# Patient Record
Sex: FEMALE | Race: WHITE | NOT HISPANIC OR LATINO | Employment: FULL TIME | URBAN - NONMETROPOLITAN AREA
[De-identification: names, ages, dates, MRNs, and addresses within clinical notes are randomized per-mention and may not be internally consistent; named-entity substitution may affect disease eponyms.]

---
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
## 2023-01-12 NOTE — H&P (VIEW-ONLY)
Nursing Notes:   Radha Lipscomb LPN  6/1/2018  2:04 PM  Signed  Patient presents to the clinic today for a follow up on meds.    Radha Lipscomb LPN.................. 6/1/2018 2:03 PM       SUBJECTIVE:   CC:  Katrina Hillman is a 13 year old female who presents to clinic today for the following health issues:  Medication follow up     HPI  Katrina Hillman is a 13 year old female in with mom for medication follow up.    She is on Lexapro for depression and anxiety symptoms.  She's been out of her lexapro for several days.  Has more negative thinking, intrusive thoughts.  Sleep has been better - attributes this to a new boyfriend and having someone to talk to before going to bed.    For the past 8 months, after eating she will have episodes of throwing up.  At other times she will throw up acid/bile.  Part feels like heartburn.   Feels better after throwing up.  If she tries to swallow it back down, it comes back up harder and faster.  No blood with throwing up. No BM changes.  Denies alcohol use.  Marijuana use is once a week.     LMP 3 weeks ago. On oral contraceptive pills.  Due for STD testing.       No Known Allergies  Current Outpatient Prescriptions   Medication     escitalopram (LEXAPRO) 10 MG tablet     norgestrel-ethinyl estradiol (LOW-OGESTREL) 0.3-30 MG-MCG per tablet     omeprazole (PRILOSEC) 20 MG CR capsule     [DISCONTINUED] escitalopram (LEXAPRO) 10 MG tablet     [DISCONTINUED] norgestrel-ethinyl estradiol (LOW-OGESTREL) 0.3-30 MG-MCG per tablet     No current facility-administered medications for this visit.       Patient Active Problem List    Diagnosis Date Noted     Gluteal tendinitis of left buttock 03/14/2018     Priority: Medium     Constipation 02/23/2018     Priority: Medium     Intentional drug overdose (H) 10/11/2017     Priority: Medium     High risk sexual behavior 04/14/2017     Priority: Medium     Deliberate self-cutting 02/27/2017     Priority: Medium     Depression in pediatric  "patient 01/15/2016     Priority: Medium     Tic disorder 07/11/2012     Priority: Medium     Past Surgical History:   Procedure Laterality Date     OTHER SURGICAL HISTORY       03/17/05,,HERNIA REPAIR,Repair of an epigastric and umbilical     Family History   Problem Relation Age of Onset     CANCER Mother      Cancer,Hodgkin's lymphoma as teenager.     Other - See Comments Father      GI Disease,Ulcer, hernia     Other - See Comments Paternal Uncle      tics in childhood       Review of Systems   Constitutional: Positive for fatigue.   Gastrointestinal: Positive for heartburn and vomiting. Negative for abdominal distention, constipation and diarrhea.   Genitourinary: Negative.    Neurological: Negative.    Psychiatric/Behavioral: Negative for behavioral problems.        No behavior problems according to patient        PHQ-2 Score:         PHQ-9 SCORE 4/14/2017 9/5/2017 6/1/2018   Total Score 15 4 7         OBJECTIVE:     /60 (BP Location: Right arm, Patient Position: Sitting, Cuff Size: Adult Regular)  Pulse 64  Ht 5' 9\" (1.753 m)  Wt 184 lb (83.5 kg)  LMP 05/11/2018 (Approximate)  Breastfeeding? No  BMI 27.17 kg/m2  Body mass index is 27.17 kg/(m^2).  Physical Exam   Constitutional: She appears well-developed and well-nourished.   Cardiovascular: Normal rate and regular rhythm.    Pulmonary/Chest: Breath sounds normal.   Abdominal: Bowel sounds are normal. She exhibits no distension. There is no tenderness. There is no rebound and no guarding.   Skin:   Healing lacerations on forearms.   Psychiatric:   More conversant today   Nursing note and vitals reviewed.       Results for orders placed or performed in visit on 06/01/18   Comprehensive metabolic panel   Result Value Ref Range    Sodium 140 134 - 144 mmol/L    Potassium 3.8 3.5 - 5.1 mmol/L    Chloride 108 (H) 98 - 107 mmol/L    Carbon Dioxide 27 21 - 31 mmol/L    Anion Gap 5 3 - 14 mmol/L    Glucose 76 70 - 105 mg/dL    Urea Nitrogen 8 7 - 25 " mg/dL    Creatinine 0.78 0.60 - 1.20 mg/dL    GFR Estimate GFR not calculated, patient <16 years old. mL/min/1.7m2    GFR Estimate If Black GFR not calculated, patient <16 years old. mL/min/1.7m2    Calcium 9.5 8.6 - 10.3 mg/dL    Bilirubin Total 0.4 0.3 - 1.0 mg/dL    Albumin 4.0 3.5 - 5.7 g/dL    Protein Total 6.9 6.4 - 8.9 g/dL    Alkaline Phosphatase 54 34 - 104 U/L    ALT 13 7 - 52 U/L    AST 16 13 - 39 U/L   CBC and Differential   Result Value Ref Range    WBC 10.6 4.0 - 11.0 10e9/L    RBC Count 4.83 3.7 - 5.3 10e12/L    Hemoglobin 13.6 11.7 - 15.7 g/dL    Hematocrit 42.1 35.0 - 47.0 %    MCV 87 77 - 100 fl    MCH 28.2 26.5 - 33.0 pg    MCHC 32.3 31.5 - 36.5 g/dL    RDW 13.0 10.0 - 15.0 %    Platelet Count 220 150 - 450 10e9/L    Diff Method Automated Method     % Neutrophils 60.3 %    % Lymphocytes 29.5 %    % Monocytes 7.0 %    % Eosinophils 2.8 %    % Basophils 0.2 %    % Immature Granulocytes 0.2 %    Absolute Neutrophil 6.4 1.3 - 7.0 10e9/L    Absolute Lymphocytes 3.1 1.0 - 5.8 10e9/L    Absolute Monocytes 0.7 0.0 - 1.3 10e9/L    Absolute Eosinophils 0.3 0.0 - 0.7 10e9/L    Absolute Basophils 0.0 0.0 - 0.2 10e9/L    Abs Immature Granulocytes 0.0 0 - 0.4 10e9/L   Lipase   Result Value Ref Range    Lipase 9 (L) 11 - 82 U/L   Drug of Abuse Screen Urine [facility]   Result Value Ref Range    Amphetamine Qual Urine Not Detected NDET^Not Detected    Benzodiazepine Qual Urine Not Detected NDET^Not Detected    Cocaine Qual Urine Not Detected NDET^Not Detected    Methadone Qual Urine Not Detected NDET^Not Detected    PCP Qual Urine Not Detected NDET^Not Detected    Opiates Qualitative Urine Not Detected NDET^Not Detected    Oxycodone Qualitative Urine Not Detected NDET^Not Detected ng/mL    Propoxyphene Qualitative Urine Not Detected NDET^Not Detected ng/mL    Tricyclic Antidepressants Qual Urine Not Detected NDET^Not Detected ng/mL    Methamphetamine Qualitative Urine Not Detected NDET^Not Detected ng/mL    Barbiturates  Qual Urine Not Detected NDET^Not Detected    Cannabinoids Qualitative Urine Presumptive positive-Unconfirmed result (A) NDET^Not Detected ng/mL    Buprenorphine Qualitative Urine Not Detected NDET^Not Detected ng/mL   GC/Chlamydia by PCR - [facility]   Result Value Ref Range    Specimen Source Urine     Neisseria gonorrhoreae PCR Not Detected NDET^Not Detected    Chlamydia Trachomatis PCR Not Detected NDET^Not Detected         ASSESSMENT/PLAN:       ICD-10-CM    1. Depression in pediatric patient F32.9 escitalopram (LEXAPRO) 10 MG tablet     Drug of Abuse Screen Urine [facility]   2. High risk sexual behavior Z72.51 GC/Chlamydia by PCR - [facility]     Drug of Abuse Screen Urine [facility]   3. Persistent recurrent vomiting R11.10 omeprazole (PRILOSEC) 20 MG CR capsule     Comprehensive metabolic panel     CBC and Differential     Lipase     GASTROENTEROLOGY ADULT REF PROCEDURE ONLY     Drug of Abuse Screen Urine [facility]     CANCELED: Drug screen comprehensive blood ([facility])   4. Oral contraception initial prescription Z30.011 norgestrel-ethinyl estradiol (LOW-OGESTREL) 0.3-30 MG-MCG per tablet     Drug of Abuse Screen Urine [facility]   5. Cannabis abuse F12.10 omeprazole (PRILOSEC) 20 MG CR capsule     Drug of Abuse Screen Urine [facility]            PLAN:  1.  Her last PHQ 9 score from the spring was 15, now improved.  We will have her continue on Lexapro 10 mg daily.  She is resistant to participating in counseling visits.  There continues to be complaint between parents, conflict between patient and friend group.  Discussed that these were issues separate from what occasion would help.  2.  STD testing obtained today, negative.  Continue oral contraceptives.  Recommend use of condoms.  3.  She started on omeprazole 20 mg a day.  Discussed that I feel that her most likely cause of vomiting episodes was related to marijuana use.  Patient is reluctant in agreement of this of as her diagnosis.  I do not think it is related to her birth control pills given timing  of onset.  Currently, SSRIs can cause nausea as well.  Recommend EGD due to FH of GI disorders/hiatal hernia.  4. Recommend no marijuana use.  Discussed with mom that I did not think my should get her driving permit until she quits smoking marijuana.      Follow up in 3 months.      LOUIS SINCLAIR MD  [facility]     lower

## 2023-02-06 ENCOUNTER — OFFICE VISIT (OUTPATIENT)
Dept: FAMILY MEDICINE | Facility: OTHER | Age: 18
End: 2023-02-06
Attending: PHYSICIAN ASSISTANT
Payer: COMMERCIAL

## 2023-02-06 VITALS
WEIGHT: 192.6 LBS | RESPIRATION RATE: 12 BRPM | DIASTOLIC BLOOD PRESSURE: 82 MMHG | SYSTOLIC BLOOD PRESSURE: 128 MMHG | HEART RATE: 69 BPM | OXYGEN SATURATION: 99 % | HEIGHT: 69 IN | BODY MASS INDEX: 28.53 KG/M2 | TEMPERATURE: 98 F

## 2023-02-06 DIAGNOSIS — G56.03 BILATERAL CARPAL TUNNEL SYNDROME: ICD-10-CM

## 2023-02-06 PROBLEM — M76.02 GLUTEAL TENDINITIS OF LEFT BUTTOCK: Status: RESOLVED | Noted: 2018-03-14 | Resolved: 2023-02-06

## 2023-02-06 PROCEDURE — 99214 OFFICE O/P EST MOD 30 MIN: CPT | Performed by: PHYSICIAN ASSISTANT

## 2023-02-06 ASSESSMENT — PAIN SCALES - GENERAL: PAINLEVEL: NO PAIN (0)

## 2023-02-06 NOTE — PROGRESS NOTES
Assessment & Plan     1. BMI 28.0-28.9,adult  Chronic, overweight, eating difficulties/disorders. Longstanding history of alternating binge eating, anorexia, trialing numerous different diets secondary to mental health.  Has been following closely with mental health through therapy and establishing with medication manager.  Patient interested in meeting with nutritionist to help relearn how to fuel her body appropriately in addition to continuing work with mental health.  Referral placed.  - Nutrition Referral; Future    2. Bilateral carpal tunnel syndrome  Chronic, stable.  Diagnosed via EMG on 11/17/2022.  Has tried and failed conservative management with bracing.  Requesting referral to orthopedics for consideration of surgical management.  - Orthopedic  Referral; Future        Return if symptoms worsen or fail to improve.    Joelle Faith PA-C  [facility]   Katrina is a 17 year old{, presenting for the following health issues:  No chief complaint on file.      History of Present Illness       Reason for visit:  Eating concerns    She eats 2-3 servings of fruits and vegetables daily.She consumes 2 sweetened beverage(s) daily.She exercises with enough effort to increase her heart rate 60 or more minutes per day.  She exercises with enough effort to increase her heart rate 5 days per week. She is missing 1 dose(s) of medications per week.  She is not taking prescribed medications regularly due to remembering to take.     Here for discussion regarding eating concerns.  Patient reports she has struggled with eating habit difficulties since she was approximately 12 years of age.  She has alternated between binge eating, starving herself, trying multiple different diets.  She feels her eating difficulties are in large part due to her mental health, body image, prior trauma, depression.  She does follow with a therapist, is establishing with a mental health medication  manager.  She would like referral to a nutritionist to help relearn how to fuel her body appropriately.    Diagnosed with bilateral carpal tunnel syndrome via EMG and November 2022.  She has worn the wrist brace consistently for 8+ weeks and has noticed minimal improvement.  She is consistently using her wrists with repetitive range of motion and lifting at her job.  She is interested in discussing surgical options with orthopedics at this time.        PAST MEDICAL HISTORY:   Past Medical History:   Diagnosis Date     Abdominal pain     04/24/08,x6 months thought to be secondary to GERD (upper GI with small bowel followthrough scheduled)     Anxiety      Carotid bruit      Constipation     No Comments Provided     Dyspnea      Gastroesophageal reflux disease with esophagitis      High risk heterosexual behavior 04/14/2017    G1 2/22     History of Pamella-Parkinson-White (WPW) syndrome     seen on 2019 [place]     Major depressive disorder, single episode     1/15/2016     Nicotine use disorder 07/20/2018     Palpitations      Pneumonia     2003,1 day hospitalization     Tetrahydrocannabinol (THC) use disorder, mild, abuse 07/20/2018     Tic disorder     07/2012,both motor and verbal       PAST SURGICAL HISTORY:   Past Surgical History:   Procedure Laterality Date     DILATION AND CURETTAGE SUCTION  02/08/2022    [facility]     DILATION AND CURETTAGE SUCTION N/A 2/9/2022    Procedure: DILATION AND CURETTAGE, UTERUS, USING SUCTION;  Surgeon: Jen Cisneros MD;  Location: [facility] OR     ESOPHAGOSCOPY, GASTROSCOPY, DUODENOSCOPY (EGD), COMBINED N/A 06/08/2018    Procedure: COMBINED ESOPHAGOSCOPY, GASTROSCOPY, DUODENOSCOPY (EGD), BIOPSY SINGLE OR MULTIPLE;  Gastroscopy;  Surgeon: Sreekanth Shaw MD;  Location: [facility] OR     HERNIA REPAIR       03/17/05,,HERNIA REPAIR,Repair of an epigastric and umbilical       FAMILY HISTORY:   Family History   Problem Relation Age of Onset     Cancer Mother         Cancer,Hodgkin's lymphoma as  "teenager.     Other - See Comments Father         GI Disease,Ulcer, hernia     Bleeding Disorder Sister         Bloody noses     Heart Defect Maternal Grandfather      Other - See Comments Paternal Uncle         tics in childhood     Anesthesia Reaction No family hx of        SOCIAL HISTORY:   Social History     Tobacco Use     Smoking status: Former     Packs/day: 0.50     Years: 5.00     Pack years: 2.50     Types: Cigarettes     Quit date: 2022     Years since quittin.1     Smokeless tobacco: Never   Substance Use Topics     Alcohol use: Not Currently     Comment: sometimes      No Known Allergies  Current Outpatient Medications   Medication     Cholecalciferol (VITAMIN D3) 50 MCG ( UT) TABS     clindamycin-benzoyl peroxide (BENZACLIN) 1-5 % external gel     Multiple Vitamins-Minerals (ONE-A-DAY WOMENS PO)     nicotine (NICODERM CQ) 14 MG/24HR 24 hr patch     nicotine (NICODERM CQ) 7 MG/24HR 24 hr patch     nicotine (NICORETTE) 4 MG lozenge     paragard intrauterine copper device     tretinoin (RETIN-A) 0.05 % external cream     nicotine (NICODERM CQ) 21 MG/24HR 24 hr patch     No current facility-administered medications for this visit.         Review of Systems   Per HPI        Objective    /82   Pulse 69   Temp 98  F (36.7  C)   Resp 12   Ht 1.753 m (5' 9\")   Wt 87.4 kg (192 lb 9.6 oz)   SpO2 99%   BMI 28.44 kg/m    Body mass index is 28.44 kg/m .  Physical Exam   General: Pleasant, in no apparent distress.  Cardiovascular: Regular rate and rhythm with S1 equal to S2. No murmurs, friction rubs, or gallops.   Respiratory: Lungs are resonant and clear to auscultation bilaterally. No wheezes, crackles, or rhonchi.  Psych: Appropriate mood and affect.      "

## 2023-02-06 NOTE — NURSING NOTE
Patient presents to clinic requesting a referral for a dietician due to mental health. Patient states that she is not eating well due to mental health.  Alina Steinberg LPN ....................  2/6/2023   3:28 PM

## 2023-02-13 ENCOUNTER — OFFICE VISIT (OUTPATIENT)
Dept: FAMILY MEDICINE | Facility: OTHER | Age: 18
End: 2023-02-13
Attending: PHYSICIAN ASSISTANT
Payer: COMMERCIAL

## 2023-02-13 PROCEDURE — 97802 MEDICAL NUTRITION INDIV IN: CPT | Mod: PN | Performed by: DIETITIAN, REGISTERED

## 2023-02-14 VITALS — BODY MASS INDEX: 28.44 KG/M2 | HEIGHT: 69 IN | WEIGHT: 192 LBS

## 2023-02-14 NOTE — PROGRESS NOTES
Katrina is here for MNT related to disordered eating, binge eating behavior.  PCP Dr. Rei Marquez.    Katrina reports she has had an eating disorder since she was 12. She started with extreme restriction and this moved to binge eating behavior. Last year she extremely limited her intake, resulting in a 30# weight loss in a short time. She has been in therapy and is doing well with her anxiety.  She works full time at a physical job. She lives with her grandparents. Food security is not an issue.    She reports no alcohol use but uses marijuana which affects her binge eating.     She takes 3,000 international unit(s) vitamin D. She notices that the spring and summer are easier for her and she has more energy.     Body mass index is 28.35 kg/m .     She currently eats 3 meals and will binge at night. Has not vomited after a binge for several months.     Estimated nutritional needs:  2,100 calories, 85 grams protein    Reviewed goal intakes using food groups and not calories or portion size. Discussed balance of healthy fats, proteins and carbs to improve mood and reduce triggers.     Gave EDQL tool for use with her therapist. Gave resources of Brain Based Health and Emotional Brain Training for therapy.    Handouts: Meal Plan with food groups and sample recipes. Strategies for assessing hunger levels and cues.     Dx: Binge Eating Behavior  Plan: Therapy weekly.  Nutrition-weekly balanced nutrition, proper intake    She will f/u in 6 weeks with RDN for further meal planning and proper nutrition goal setting     Time spent: 60 min  KRISTIN K. KLINEFELTER, RD on 2/14/2023 at 10:22 AM

## 2023-03-17 ENCOUNTER — OFFICE VISIT (OUTPATIENT)
Dept: FAMILY MEDICINE | Facility: OTHER | Age: 18
End: 2023-03-17
Attending: PHYSICIAN ASSISTANT
Payer: COMMERCIAL

## 2023-03-17 VITALS
SYSTOLIC BLOOD PRESSURE: 98 MMHG | BODY MASS INDEX: 28.5 KG/M2 | HEART RATE: 88 BPM | OXYGEN SATURATION: 98 % | RESPIRATION RATE: 16 BRPM | WEIGHT: 193 LBS | DIASTOLIC BLOOD PRESSURE: 58 MMHG | TEMPERATURE: 97.1 F

## 2023-03-17 DIAGNOSIS — R53.83 FATIGUE, UNSPECIFIED TYPE: Primary | ICD-10-CM

## 2023-03-17 DIAGNOSIS — G44.209 TENSION HEADACHE: ICD-10-CM

## 2023-03-17 LAB
ALBUMIN SERPL BCG-MCNC: 4.5 G/DL (ref 3.5–5.2)
ALP SERPL-CCNC: 72 U/L (ref 35–104)
ALT SERPL W P-5'-P-CCNC: 31 U/L (ref 10–35)
ANION GAP SERPL CALCULATED.3IONS-SCNC: 8 MMOL/L (ref 7–15)
AST SERPL W P-5'-P-CCNC: 39 U/L (ref 10–35)
BASOPHILS # BLD AUTO: 0 10E3/UL (ref 0–0.2)
BASOPHILS NFR BLD AUTO: 0 %
BILIRUB SERPL-MCNC: 0.5 MG/DL
BUN SERPL-MCNC: 17.4 MG/DL (ref 6–20)
CALCIUM SERPL-MCNC: 9.7 MG/DL (ref 8.6–10)
CHLORIDE SERPL-SCNC: 102 MMOL/L (ref 98–107)
CREAT SERPL-MCNC: 0.8 MG/DL (ref 0.51–0.95)
DEPRECATED HCO3 PLAS-SCNC: 28 MMOL/L (ref 22–29)
EOSINOPHIL # BLD AUTO: 0.2 10E3/UL (ref 0–0.7)
EOSINOPHIL NFR BLD AUTO: 1 %
ERYTHROCYTE [DISTWIDTH] IN BLOOD BY AUTOMATED COUNT: 13 % (ref 10–15)
FERRITIN SERPL-MCNC: 48 NG/ML (ref 6–175)
GFR SERPL CREATININE-BSD FRML MDRD: >90 ML/MIN/1.73M2
GLUCOSE SERPL-MCNC: 83 MG/DL (ref 70–99)
HCT VFR BLD AUTO: 38.6 % (ref 35–47)
HGB BLD-MCNC: 13 G/DL (ref 11.7–15.7)
IMM GRANULOCYTES # BLD: 0 10E3/UL
IMM GRANULOCYTES NFR BLD: 0 %
IRON BINDING CAPACITY (ROCHE): 352 UG/DL (ref 240–430)
IRON SATN MFR SERPL: 16 % (ref 15–46)
IRON SERPL-MCNC: 58 UG/DL (ref 37–145)
LYMPHOCYTES # BLD AUTO: 3.5 10E3/UL (ref 0.8–5.3)
LYMPHOCYTES NFR BLD AUTO: 31 %
MAGNESIUM SERPL-MCNC: 2.1 MG/DL (ref 1.7–2.3)
MCH RBC QN AUTO: 28.9 PG (ref 26.5–33)
MCHC RBC AUTO-ENTMCNC: 33.7 G/DL (ref 31.5–36.5)
MCV RBC AUTO: 86 FL (ref 78–100)
MONOCYTES # BLD AUTO: 0.8 10E3/UL (ref 0–1.3)
MONOCYTES NFR BLD AUTO: 7 %
NEUTROPHILS # BLD AUTO: 6.7 10E3/UL (ref 1.6–8.3)
NEUTROPHILS NFR BLD AUTO: 61 %
NRBC # BLD AUTO: 0 10E3/UL
NRBC BLD AUTO-RTO: 0 /100
PLATELET # BLD AUTO: 195 10E3/UL (ref 150–450)
POTASSIUM SERPL-SCNC: 3.6 MMOL/L (ref 3.4–5.3)
PROT SERPL-MCNC: 7.3 G/DL (ref 6.4–8.3)
RBC # BLD AUTO: 4.5 10E6/UL (ref 3.8–5.2)
SODIUM SERPL-SCNC: 138 MMOL/L (ref 136–145)
TSH SERPL DL<=0.005 MIU/L-ACNC: 1.52 UIU/ML (ref 0.5–4.3)
WBC # BLD AUTO: 11.3 10E3/UL (ref 4–11)

## 2023-03-17 PROCEDURE — 83735 ASSAY OF MAGNESIUM: CPT | Mod: ZL | Performed by: PHYSICIAN ASSISTANT

## 2023-03-17 PROCEDURE — 36415 COLL VENOUS BLD VENIPUNCTURE: CPT | Mod: ZL | Performed by: PHYSICIAN ASSISTANT

## 2023-03-17 PROCEDURE — 82306 VITAMIN D 25 HYDROXY: CPT | Mod: ZL | Performed by: PHYSICIAN ASSISTANT

## 2023-03-17 PROCEDURE — 80053 COMPREHEN METABOLIC PANEL: CPT | Mod: ZL | Performed by: PHYSICIAN ASSISTANT

## 2023-03-17 PROCEDURE — 82728 ASSAY OF FERRITIN: CPT | Mod: ZL | Performed by: PHYSICIAN ASSISTANT

## 2023-03-17 PROCEDURE — 83550 IRON BINDING TEST: CPT | Mod: ZL | Performed by: PHYSICIAN ASSISTANT

## 2023-03-17 PROCEDURE — 84443 ASSAY THYROID STIM HORMONE: CPT | Mod: ZL | Performed by: PHYSICIAN ASSISTANT

## 2023-03-17 PROCEDURE — 99214 OFFICE O/P EST MOD 30 MIN: CPT | Performed by: PHYSICIAN ASSISTANT

## 2023-03-17 PROCEDURE — 85025 COMPLETE CBC W/AUTO DIFF WBC: CPT | Mod: ZL | Performed by: PHYSICIAN ASSISTANT

## 2023-03-17 RX ORDER — TRETINOIN 1 MG/G
CREAM TOPICAL
COMMUNITY
Start: 2023-03-16

## 2023-03-17 ASSESSMENT — PAIN SCALES - GENERAL: PAINLEVEL: NO PAIN (0)

## 2023-03-17 NOTE — NURSING NOTE
"Pt presents to clinic today for migraines and \"brain cramps\"  Patient states she feels like a poke in her brain or her brain is being squeezed. States this is different than when she gets migraines. Only lasts for about 5 seconds. States she also sees lightning bolts and vision impairment with her migraines and states her vision has been getting worse, also very fatigued and out of breath lately.   Legs feel like they could give out.       FOOD SECURITY SCREENING QUESTIONS:    The next two questions are to help us understand your food security.  If you are feeling you need any assistance in this area, we have resources available to support you today.    Hunger Vital Signs:  Within the past 12 months we worried whether our food would run out before we got money to buy more. Never  Within the past 12 months the food we bought just didn't last and we didn't have money to get more. Never          Medication Reconciliation: complete  Lorena Escalona LPN,LPN on 3/17/2023 at 3:43 PM   "

## 2023-03-17 NOTE — PROGRESS NOTES
"  Assessment & Plan   Problem List Items Addressed This Visit    None  Visit Diagnoses     Fatigue, unspecified type    -  Primary    Relevant Orders    CBC and Differential    Comprehensive Metabolic Panel    TSH Reflex GH    Vitamin D Total    Ferritin    Iron & Iron Binding Capacity    Magnesium    Tension headache        Relevant Orders    Adult Neurology  Referral    CBC and Differential    Comprehensive Metabolic Panel    TSH Reflex GH    Vitamin D Total    Ferritin    Iron & Iron Binding Capacity    Physical Therapy Referral    Magnesium         Completed a thorough lab work-up to rule out concerns.  Referred to neurology for consult.  Unremarkable neuro exam today.  No emergent head CT is warranted at this time.  Encourage close monitoring with her PCP if symptoms are not improving or worsening.  Encouraged warm compresses on the neck along with yoga and stretching frequently.  Symptoms likely due to tension headaches.  Encouraged to have her eyes checked for monitoring.  Discussed anxiety symptoms at length.  Encouraged to see a therapist if needed.  Gave warning signs and symptoms. Return to clinic with change/worsening of symptoms.     Ordering of each unique test  30 minutes spent on the date of the encounter doing chart review, history and exam, documentation and further activities per the note       BMI:   Estimated body mass index is 28.5 kg/m  as calculated from the following:    Height as of 2/14/23: 1.753 m (5' 9\").    Weight as of this encounter: 87.5 kg (193 lb).       See Patient Instructions    Return if symptoms worsen or fail to improve.    Wendy Campo PA-C  [facility] is a 17 year old, presenting for the following health issues:  Migraine (Head \"brain cramps\")      History of Present Illness       Headaches:   Since the patient's last clinic visit, headaches are: no change  The patient is getting headaches:  Daily or every other day  She " "is not able to do normal daily activities when she has a migraine.  The patient is taking the following rescue/relief medications:  Ibuprofen (Advil, Motrin)   Patient states \"I get only a small amount of relief\" from the rescue/relief medications.   The patient is taking the following medications to prevent migraines:  No medications to prevent migraines  In the past 4 weeks, the patient has gone to an Urgent Care or Emergency Room 0 times times due to headaches.    She eats 2-3 servings of fruits and vegetables daily.She consumes 1 sweetened beverage(s) daily.She exercises with enough effort to increase her heart rate 60 or more minutes per day.  She exercises with enough effort to increase her heart rate 5 days per week.        Pt presents to clinic today for migraines and \"brain cramps\"  Patient states she feels like a poke in her brain or her brain is being squeezed. States this is different than when she gets migraines. Only lasts for about 5 seconds. States she also sees lightning bolts and vision impairment with her migraines and states her vision has been getting worse, also very fatigued and out of breath lately.   Legs feel like they could give out.     Patient has been struggling with some brain cramp type feelings over the last couple months.  Feels like her brain is being squeezed or poked.  Gets a short-lived intense pain that lasts less than 5 seconds and then it is gone.  Feels like her vision overall is getting worse.  Legs feel like they are going to give out at times.  Unsure if this is related.  A few weeks ago she had a severe migraine.  History of migraines in the past.  That specific migraine lasted for 2 to 4 days.  In the evening she was seeing lightning bolts out of her eye.  Feels like she is always a stressed out type of person.  No fevers, chills, cough or cold symptoms.  Has gained 8 pounds in the last couple weeks.  Feels too full and has no appetite.  Has stomach cramps near her " [facility] region.  Occasional short-lived chest pain at times that is quick for second and then gone.  Happens 1-2 times per week.  History of a Pamella-Parkinson-White syndrome.  Feels lightheaded and dizzy at times.  Currently feeling fine.  Seeing a chiropractor for her symptoms.  Has a lot of neck tension.  No urinary symptoms.    Review of Systems   Constitutional, HEENT, cardiovascular, pulmonary, gi and gu systems are negative, except as otherwise noted.      Objective    BP 98/58 (BP Location: Right arm, Patient Position: Sitting, Cuff Size: Adult Large)   Pulse 88   Temp 97.1  F (36.2  C) (Tympanic)   Resp 16   Wt 87.5 kg (193 lb)   LMP 02/21/2023 (Exact Date)   SpO2 98%   BMI 28.50 kg/m    Body mass index is 28.5 kg/m .  Physical Exam  Vitals reviewed.   Constitutional:       General: She is not in acute distress.     Appearance: Normal appearance. She is well-developed. She is not ill-appearing.   HENT:      Head: Normocephalic.      Right Ear: Tympanic membrane, ear canal and external ear normal.      Left Ear: Tympanic membrane, ear canal and external ear normal.      Nose: Nose normal.      Mouth/Throat:      Mouth: Mucous membranes are moist.      Pharynx: No oropharyngeal exudate.   Eyes:      General:         Right eye: No discharge.         Left eye: No discharge.      Extraocular Movements: Extraocular movements intact.      Conjunctiva/sclera: Conjunctivae normal.      Pupils: Pupils are equal, round, and reactive to light.   Cardiovascular:      Rate and Rhythm: Normal rate and regular rhythm.      Heart sounds: Normal heart sounds, S1 normal and S2 normal. No murmur heard.  Pulmonary:      Effort: Pulmonary effort is normal. No respiratory distress.      Breath sounds: Normal breath sounds. No wheezing or rales.   Musculoskeletal:         General: No swelling or signs of injury. Normal range of motion.      Cervical back: Normal range of motion and neck supple.      Right lower leg: No  edema.      Left lower leg: No edema.      Comments: No pinpoint spinal pain with palpation.  Mild cervical paraspinous muscle pain with palpation throughout.  No bruising or swelling appreciated.   Lymphadenopathy:      Cervical: No cervical adenopathy.   Skin:     General: Skin is warm and dry.      Capillary Refill: Capillary refill takes less than 2 seconds.      Findings: No rash.   Neurological:      General: No focal deficit present.      Mental Status: She is alert and oriented to person, place, and time.      Cranial Nerves: Cranial nerves 2-12 are intact. No cranial nerve deficit.      Sensory: Sensation is intact. No sensory deficit.      Motor: Motor function is intact. No weakness or abnormal muscle tone.      Coordination: Coordination is intact. Romberg sign negative. Coordination normal. Finger-Nose-Finger Test normal.      Gait: Gait normal.      Deep Tendon Reflexes: Reflexes are normal and symmetric.   Psychiatric:         Mood and Affect: Mood normal.         Behavior: Behavior normal.         Thought Content: Thought content normal.         Judgment: Judgment normal.

## 2023-03-17 NOTE — PATIENT INSTRUCTIONS
Encouraged to take ibuprofen for relief up to 4 times per day.  Encouraged rest and elevation.  Encouraged to use ice or heat 15 minutes at a time several times per day to decrease pain. Return to clinic in 1-2 weeks as necessary for persistent pain. Return to clinic with any change or worsening of symptoms.

## 2023-03-20 ENCOUNTER — OFFICE VISIT (OUTPATIENT)
Dept: FAMILY MEDICINE | Facility: OTHER | Age: 18
End: 2023-03-20
Attending: PHYSICIAN ASSISTANT
Payer: COMMERCIAL

## 2023-03-20 LAB — DEPRECATED CALCIDIOL+CALCIFEROL SERPL-MC: 69 UG/L (ref 20–75)

## 2023-03-20 PROCEDURE — 97803 MED NUTRITION INDIV SUBSEQ: CPT | Mod: PN | Performed by: DIETITIAN, REGISTERED

## 2023-03-20 NOTE — PROGRESS NOTES
Katrina is here for MNT f/u related to BMI 28 and Hx binge eating disorder.     PCP: Dr. Thomas    Katrina reports improvement in her awareness of hunger/fullness and less binge eating. She is working on strategies to channel her desire to binge, such as taking a walk.    She continues to work with her therapist for medication and therapy related to her depression.    8# weight gain noted, but she is feeling ok about this as she has been eating intuitively and wants to continue to improve her energy.    Nutrition labs reviewed. All WNL. Vit D pending.     Today she would like more grocery shopping and meal planning tips. We reviewed this and she will work on this with her grandma.     We will follow-up in 6 weeks for continued nutrition goal settings.    Time spent: 15 min    KRISTIN K. KLINEFELTER, RD on 3/20/2023 at 2:55 PM

## 2023-03-27 DIAGNOSIS — G56.03 BILATERAL CARPAL TUNNEL SYNDROME: Primary | ICD-10-CM

## 2023-03-31 ENCOUNTER — OFFICE VISIT (OUTPATIENT)
Dept: ORTHOPEDICS | Facility: OTHER | Age: 18
End: 2023-03-31
Attending: PHYSICIAN ASSISTANT
Payer: COMMERCIAL

## 2023-03-31 ENCOUNTER — HOSPITAL ENCOUNTER (OUTPATIENT)
Dept: GENERAL RADIOLOGY | Facility: OTHER | Age: 18
Discharge: HOME | End: 2023-03-31
Attending: SPECIALIST
Payer: COMMERCIAL

## 2023-03-31 VITALS — HEART RATE: 65 BPM | OXYGEN SATURATION: 98 %

## 2023-03-31 DIAGNOSIS — G56.03 BILATERAL CARPAL TUNNEL SYNDROME: Chronic | ICD-10-CM

## 2023-03-31 DIAGNOSIS — G56.03 BILATERAL CARPAL TUNNEL SYNDROME: ICD-10-CM

## 2023-03-31 PROCEDURE — 99213 OFFICE O/P EST LOW 20 MIN: CPT | Performed by: SPECIALIST

## 2023-03-31 PROCEDURE — 73110 X-RAY EXAM OF WRIST: CPT | Mod: RT

## 2023-03-31 ASSESSMENT — PAIN SCALES - GENERAL: PAINLEVEL: NO PAIN (1)

## 2023-03-31 NOTE — PROGRESS NOTES
Visit Date: 2023    HISTORY OF PRESENT ILLNESS:  Katrina is a 17-year-old right-hand dominant female who works at the [employer].  She is seen today for evaluation of bilateral hand issues.  She underwent an EMG study and was referred.  The patient is not a diabetic.  She has no significant neck pain.  She describes discomfort in the hands and wrists when she uses the hands.  She does a fair amount of heavy work.  The patient is not a diabetic.  No neck pain.    PHYSICAL EXAMINATION:  Reveals a 17-year-old female.  She is alert and oriented x3, and appropriate.  Gait and station are appropriate.  She is well groomed, well kempt.  Examination of both upper extremities reveals full and symmetric range of motion of the shoulders and elbows.  Examination of both hands and wrists shows no evidence of obvious extensor or flexor tenosynovitis.  She has multiple well-healed scars along the volar aspect of her wrist.  Her digital range of motion is full.    IMAGING:  X-rays are reviewed including AP, lateral, and oblique views of both hands and wrists, as well as carpal tunnel views.  These show uniform mineralization, well maintained joint spaces, no evidence of fracture and/or dislocation.  EMG studies reviewed from Dr. Sheldon dated 2022.  The EMG studies shows the patient to be essentially intact with the exception of some very mild carpal tunnel syndrome.    IMPRESSION:  Bilateral upper extremity complaints.  The patient reports her symptoms have actually improved of late.    PLAN:  We discussed considering a carpal tunnel injection if her symptoms worsen.  We will allow her to return to regular activities.  We will try nonsteroidals and we will see her back as symptoms warrant on an as-needed basis.    Ted Bobby MD        D: 2023   T: 2023   MT: [id number]    Name:     KATRINA REDMOND  MRN:      [id number]        Account:    [id number]   :      2005           Visit Date:  03/31/2023     Document: [id number]

## 2023-03-31 NOTE — PROGRESS NOTES
Patient is here for consult on her hands.  Charissa Hinds LPN .....................3/31/2023 2:24 PM

## 2023-05-24 ENCOUNTER — ALLIED HEALTH/NURSE VISIT (OUTPATIENT)
Dept: FAMILY MEDICINE | Facility: OTHER | Age: 18
End: 2023-05-24
Attending: PHYSICIAN ASSISTANT
Payer: COMMERCIAL

## 2023-05-24 DIAGNOSIS — Z23 NEED FOR VACCINATION: Primary | ICD-10-CM

## 2023-05-24 PROCEDURE — 0121A COVID-19 BIVALENT 12+ (PFIZER): CPT

## 2023-05-24 PROCEDURE — 91312 COVID-19 BIVALENT 12+ (PFIZER): CPT

## 2023-05-24 NOTE — PROGRESS NOTES
Immunization Documentation  Verified patient's first and last name, and . Stated reason for visit today is to receive covid bivalent vaccine. Denied any concerns with previous immunizations. Allergies reviewed. VIS handout(s) reviewed and given to take home. Vaccine prepared and administered per standing order. Administration documented in IMMUNIZATIONS (see flowsheet and order for further information). Instructed to wait in lobby for 15 minutes post-injection and notify staff immediately of any reaction.     TYLOR NIELSEN RN on 2023 at 4:19 PM

## 2023-05-31 ENCOUNTER — OFFICE VISIT (OUTPATIENT)
Dept: FAMILY MEDICINE | Facility: OTHER | Age: 18
End: 2023-05-31
Payer: COMMERCIAL

## 2023-05-31 VITALS
SYSTOLIC BLOOD PRESSURE: 110 MMHG | HEART RATE: 57 BPM | BODY MASS INDEX: 29.16 KG/M2 | WEIGHT: 196.9 LBS | RESPIRATION RATE: 16 BRPM | HEIGHT: 69 IN | OXYGEN SATURATION: 97 % | TEMPERATURE: 98.4 F | DIASTOLIC BLOOD PRESSURE: 50 MMHG

## 2023-05-31 DIAGNOSIS — Z30.431 IUD CHECK UP: ICD-10-CM

## 2023-05-31 DIAGNOSIS — Z11.3 ROUTINE SCREENING FOR STI (SEXUALLY TRANSMITTED INFECTION): ICD-10-CM

## 2023-05-31 DIAGNOSIS — R10.32 BILATERAL LOWER ABDOMINAL CRAMPING: ICD-10-CM

## 2023-05-31 DIAGNOSIS — N92.6 IRREGULAR BLEEDING: ICD-10-CM

## 2023-05-31 DIAGNOSIS — R10.31 BILATERAL LOWER ABDOMINAL CRAMPING: ICD-10-CM

## 2023-05-31 DIAGNOSIS — N94.10 PAIN IN FEMALE GENITALIA ON INTERCOURSE: Primary | ICD-10-CM

## 2023-05-31 DIAGNOSIS — B37.31 YEAST INFECTION OF THE VAGINA: ICD-10-CM

## 2023-05-31 LAB
ALBUMIN UR-MCNC: NEGATIVE MG/DL
APPEARANCE UR: ABNORMAL
BILIRUB UR QL STRIP: NEGATIVE
C TRACH DNA SPEC QL PROBE+SIG AMP: NEGATIVE
CLUE CELLS: ABNORMAL
COLOR UR AUTO: YELLOW
GLUCOSE UR STRIP-MCNC: NEGATIVE MG/DL
HCG UR QL: NEGATIVE
HGB UR QL STRIP: ABNORMAL
KETONES UR STRIP-MCNC: NEGATIVE MG/DL
LEUKOCYTE ESTERASE UR QL STRIP: NEGATIVE
N GONORRHOEA DNA SPEC QL NAA+PROBE: NEGATIVE
NITRATE UR QL: NEGATIVE
PH UR STRIP: 7.5 [PH] (ref 5–9)
RBC URINE: >182 /HPF
SP GR UR STRIP: 1.01 (ref 1–1.03)
SQUAMOUS EPITHELIAL: 1 /HPF
TRICHOMONAS, WET PREP: ABNORMAL
UROBILINOGEN UR STRIP-MCNC: NORMAL MG/DL
WBC URINE: 6 /HPF
WBC'S/HIGH POWER FIELD, WET PREP: ABNORMAL
YEAST, WET PREP: PRESENT

## 2023-05-31 PROCEDURE — 87491 CHLMYD TRACH DNA AMP PROBE: CPT | Mod: ZL | Performed by: NURSE PRACTITIONER

## 2023-05-31 PROCEDURE — 86803 HEPATITIS C AB TEST: CPT | Mod: ZL | Performed by: NURSE PRACTITIONER

## 2023-05-31 PROCEDURE — 86780 TREPONEMA PALLIDUM: CPT | Mod: ZL | Performed by: NURSE PRACTITIONER

## 2023-05-31 PROCEDURE — 99214 OFFICE O/P EST MOD 30 MIN: CPT | Performed by: NURSE PRACTITIONER

## 2023-05-31 PROCEDURE — 86696 HERPES SIMPLEX TYPE 2 TEST: CPT | Mod: ZL | Performed by: NURSE PRACTITIONER

## 2023-05-31 PROCEDURE — 81001 URINALYSIS AUTO W/SCOPE: CPT | Mod: ZL | Performed by: NURSE PRACTITIONER

## 2023-05-31 PROCEDURE — 87389 HIV-1 AG W/HIV-1&-2 AB AG IA: CPT | Mod: ZL | Performed by: NURSE PRACTITIONER

## 2023-05-31 PROCEDURE — 81025 URINE PREGNANCY TEST: CPT | Mod: ZL | Performed by: NURSE PRACTITIONER

## 2023-05-31 PROCEDURE — 87210 SMEAR WET MOUNT SALINE/INK: CPT | Mod: ZL | Performed by: NURSE PRACTITIONER

## 2023-05-31 PROCEDURE — 36415 COLL VENOUS BLD VENIPUNCTURE: CPT | Mod: ZL | Performed by: NURSE PRACTITIONER

## 2023-05-31 PROCEDURE — 86706 HEP B SURFACE ANTIBODY: CPT | Mod: ZL | Performed by: NURSE PRACTITIONER

## 2023-05-31 RX ORDER — FLUCONAZOLE 150 MG/1
150 TABLET ORAL ONCE
Qty: 1 TABLET | Refills: 0 | Status: SHIPPED | OUTPATIENT
Start: 2023-05-31 | End: 2023-05-31

## 2023-05-31 RX ORDER — PROPRANOLOL HYDROCHLORIDE 20 MG/1
TABLET ORAL
COMMUNITY
Start: 2023-05-17 | End: 2023-09-21

## 2023-05-31 RX ORDER — LAMOTRIGINE 150 MG/1
TABLET ORAL
COMMUNITY
Start: 2023-05-17 | End: 2024-01-17

## 2023-05-31 ASSESSMENT — PATIENT HEALTH QUESTIONNAIRE - PHQ9
SUM OF ALL RESPONSES TO PHQ QUESTIONS 1-9: 19
10. IF YOU CHECKED OFF ANY PROBLEMS, HOW DIFFICULT HAVE THESE PROBLEMS MADE IT FOR YOU TO DO YOUR WORK, TAKE CARE OF THINGS AT HOME, OR GET ALONG WITH OTHER PEOPLE: VERY DIFFICULT
SUM OF ALL RESPONSES TO PHQ QUESTIONS 1-9: 19

## 2023-05-31 ASSESSMENT — ENCOUNTER SYMPTOMS
RESPIRATORY NEGATIVE: 1
NEUROLOGICAL NEGATIVE: 1
CONSTITUTIONAL NEGATIVE: 1
CARDIOVASCULAR NEGATIVE: 1
ABDOMINAL PAIN: 1
MUSCULOSKELETAL NEGATIVE: 1
DIFFICULTY URINATING: 0

## 2023-05-31 ASSESSMENT — PAIN SCALES - GENERAL: PAINLEVEL: NO PAIN (0)

## 2023-05-31 NOTE — NURSING NOTE
Chief Complaint   Patient presents with     IUD problems      Patient presents to the clinic for IUD problems patient is having pain after intercourse. Patient stated that she started bleeding on the 29 th and it is not the same kind of blood as a period, patient described it as being bright red. She stated she is due to have her period within the following week. IUD was placed in January 2022.     Raiza Prieto LPN       FOOD SECURITY SCREENING QUESTIONS:    The next two questions are to help us understand your food security.  If you are feeling you need any assistance in this area, we have resources available to support you today.    Hunger Vital Signs:  Within the past 12 months we worried whether our food would run out before we got money to buy more. Never  Within the past 12 months the food we bought just didn't last and we didn't have money to get more. Never  Raiza Prieto LPN,LPN on 5/31/2023 at 3:59 PM    Food Insecurity: Not on file

## 2023-05-31 NOTE — PROGRESS NOTES
Katrina Hillman  2005    ASSESSMENT/PLAN:   1. Pain in female genitalia on intercourse  2. Bilateral lower abdominal cramping  3. Irregular bleeding  4. Routine screening for STI (sexually transmitted infection)  - UA Macroscopic with reflex to Microscopic and Culture  - Wet Prep, Genital  - Chlamydia trachomatis/Neisseria gonorrhoeae by PCR  - HIV Antigen Antibody Combo; Future  - Hepatitis C Screen Reflex to HCV RNA Quant and Genotype; Future  - Hepatitis B Surface Antibody; Future  - Treponema Ab w Reflex to RPR and Titer; Future  - Herpes Simplex Virus 1 and 2 IgG; Future    Patient is sexually active with new partner.  Patient would like to proceed with full STI screening.  Tests are still processing.  She will be notified of these results and treated appropriately once they return.  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  Wet prep returned consistent with vaginal yeast infection, negative for trichomonas and bacterial vaginosis.  She was started on Diflucan.  Urinalysis returned negative for acute cystitis.    5. IUD check up  - Pregnancy, Urine (HCG); Future  On exam patient does have mild amount of bloody discharge.  Her last menstrual cycle was 4/29/2023.  Reviewed with patient this is likely her menstrual cycle.  Did not notice any normal vaginal discharge or findings.  Cervix are nonfriable, IUD strings visible.  Pregnancy test negative.  Should she continue to have pelvic discomfort, irregular bleeding after her menses ends, I would recommend follow-up with OB/GYN and consideration of transvaginal to sound.    6. Yeast infection of the vagina  Prep returned positive for vaginal yeast infection.  Patient started on oral Diflucan once.  - fluconazole (DIFLUCAN) 150 MG tablet; Take 1 tablet (150 mg) by mouth once for 1 dose  Dispense: 1 tablet; Refill: 0    Patient agrees with plan of care and verbalizes understating. AVS printed. Patient education provided verbally and written instructions provided as  "requested. Patient made aware of emergent sings and symptoms to monitor for and when to seek additional care/follow up.     SUBJECTIVE:   CHIEF COMPLAINT/ REASON FOR VISIT  Patient presents with:  IUD problems      HISTORY OF PRESENT ILLNESS  Katrina Hillman is a pleasant 18 year old female presents to [facility] today concerns for IUD concerns.  Patient had IUD inserted January 2022.  She is continue to have regular menstrual cycle each month.  Over the past few months she has had more lower abdominal cramping.  She is also having spotting in between her period.  Her last menstrual period was 4/29/2023.  She is having bright red blood intermittently, but does not seem like a regular period.  She is having more pain, cramping and bleeding, especially after intercourse.  She has felt her IUD strings and believes it is in place, but would like this checked.  She denies any dysuria, urinary frequency or urgency.  A few weeks ago she did notice some pink tinge in her urine.  No change in vaginal discharge or vaginal itching.    I have reviewed the nursing notes.  I have reviewed allergies, medication list, problem list, and past medical history.    REVIEW OF SYSTEMS  Review of Systems   Constitutional: Negative.    HENT: Negative.    Respiratory: Negative.    Cardiovascular: Negative.    Gastrointestinal: Positive for abdominal pain.   Genitourinary: Positive for pelvic pain, vaginal bleeding and vaginal pain. Negative for difficulty urinating.        Pain with intercourse   Musculoskeletal: Negative.    Neurological: Negative.    All other systems reviewed and are negative.       VITAL SIGNS  Vitals:    05/31/23 1605   BP: 110/50   BP Location: Right arm   Patient Position: Sitting   Cuff Size: Adult Regular   Pulse: 57   Resp: 16   Temp: 98.4  F (36.9  C)   TempSrc: Tympanic   SpO2: 97%   Weight: 89.3 kg (196 lb 14.4 oz)   Height: 1.755 m (5' 9.09\")      Body mass index is 29 kg/m .    OBJECTIVE:   PHYSICAL " EXAM  Physical Exam  Vitals reviewed. Exam conducted with a chaperone present.   Constitutional:       Appearance: Normal appearance. She is not ill-appearing or toxic-appearing.   HENT:      Head: Normocephalic and atraumatic.      Nose: Nose normal.   Eyes:      Conjunctiva/sclera: Conjunctivae normal.   Cardiovascular:      Rate and Rhythm: Normal rate and regular rhythm.      Pulses: Normal pulses.      Heart sounds: Normal heart sounds. No murmur heard.  Pulmonary:      Effort: Pulmonary effort is normal.      Breath sounds: Normal breath sounds. No wheezing.   Abdominal:      General: Bowel sounds are normal. There is distension.      Palpations: Abdomen is soft.      Tenderness: There is abdominal tenderness. There is no right CVA tenderness or left CVA tenderness.   Genitourinary:     Comments: Female exam-  Patient placed on exam table in prone position/lithotomy position.  Chaperone present.  Skin warm dry and intact.  No visible rash or abrasions.  No visible concerning external vaginal or anal lesions.  Cervix visible and non friable.  IUD strings present.  Malodorous.  Bloody vaginal discharge.  Speculum exam complete, samples obtained if indicated.    Neurological:      General: No focal deficit present.      Mental Status: She is alert and oriented to person, place, and time.   Psychiatric:         Mood and Affect: Mood normal.         Behavior: Behavior normal.         Thought Content: Thought content normal.         Judgment: Judgment normal.        DIAGNOSTICS  Results for orders placed or performed in visit on 05/31/23   UA Macroscopic with reflex to Microscopic and Culture     Status: Abnormal    Specimen: Urine, Midstream   Result Value Ref Range    Color Urine Yellow Colorless, Straw, Light Yellow, Yellow    Appearance Urine Slightly Cloudy (A) Clear    Glucose Urine Negative Negative mg/dL    Bilirubin Urine Negative Negative    Ketones Urine Negative Negative mg/dL    Specific Gravity Urine  1.008 1.000 - 1.030    Blood Urine Large (A) Negative    pH Urine 7.5 5.0 - 9.0    Protein Albumin Urine Negative Negative mg/dL    Urobilinogen Urine Normal Normal, 2.0 mg/dL    Nitrite Urine Negative Negative    Leukocyte Esterase Urine Negative Negative    RBC Urine >182 (H) <=2 /HPF    WBC Urine 6 (H) <=5 /HPF    Squamous Epithelials Urine 1 <=1 /HPF    Narrative    Urine Culture not indicated   Pregnancy, Urine (HCG)     Status: Normal   Result Value Ref Range    hCG Urine Qualitative Negative Negative   Wet Prep, Genital     Status: Abnormal    Specimen: Vagina; Swab   Result Value Ref Range    Trichomonas Absent Absent    Yeast Present (A) Absent    Clue Cells Absent Absent    WBCs/high power field 1+ (A) None        Michelle Rowell NP  [facility]

## 2023-06-02 LAB
HBV SURFACE AB SERPL IA-ACNC: 2.56 M[IU]/ML
HBV SURFACE AB SERPL IA-ACNC: NONREACTIVE M[IU]/ML
HCV AB SERPL QL IA: NONREACTIVE
HIV 1+2 AB+HIV1 P24 AG SERPL QL IA: NONREACTIVE
HSV1 IGG SERPL QL IA: 33.3 INDEX
HSV1 IGG SERPL QL IA: ABNORMAL
HSV2 IGG SERPL QL IA: 0.33 INDEX
HSV2 IGG SERPL QL IA: ABNORMAL
T PALLIDUM AB SER QL: NONREACTIVE

## 2023-06-04 ENCOUNTER — HEALTH MAINTENANCE LETTER (OUTPATIENT)
Age: 18
End: 2023-06-04

## 2023-06-05 DIAGNOSIS — B37.31 YEAST INFECTION OF THE VAGINA: ICD-10-CM

## 2023-06-06 RX ORDER — FLUCONAZOLE 150 MG/1
TABLET ORAL
Qty: 1 TABLET | Refills: 0 | OUTPATIENT
Start: 2023-06-06

## 2023-06-16 ENCOUNTER — OFFICE VISIT (OUTPATIENT)
Dept: OBGYN | Facility: OTHER | Age: 18
End: 2023-06-16
Payer: COMMERCIAL

## 2023-06-16 VITALS
HEART RATE: 72 BPM | OXYGEN SATURATION: 99 % | BODY MASS INDEX: 28.87 KG/M2 | DIASTOLIC BLOOD PRESSURE: 60 MMHG | WEIGHT: 196 LBS | SYSTOLIC BLOOD PRESSURE: 92 MMHG

## 2023-06-16 DIAGNOSIS — Z30.432 ENCOUNTER FOR IUD REMOVAL: ICD-10-CM

## 2023-06-16 DIAGNOSIS — N89.8 VAGINAL DISCHARGE: Primary | ICD-10-CM

## 2023-06-16 LAB
CLUE CELLS: NORMAL
TRICHOMONAS, WET PREP: NORMAL
WBC'S/HIGH POWER FIELD, WET PREP: NORMAL
YEAST, WET PREP: NORMAL

## 2023-06-16 PROCEDURE — 99213 OFFICE O/P EST LOW 20 MIN: CPT | Mod: 25

## 2023-06-16 PROCEDURE — 87210 SMEAR WET MOUNT SALINE/INK: CPT | Mod: ZL

## 2023-06-16 PROCEDURE — 58301 REMOVE INTRAUTERINE DEVICE: CPT

## 2023-06-16 ASSESSMENT — PAIN SCALES - GENERAL: PAINLEVEL: NO PAIN (0)

## 2023-06-16 NOTE — NURSING NOTE
Patient presents to clinic for IUD removal and concerns of yeast infection  BP 92/60   Pulse 72   Wt 88.9 kg (196 lb)   LMP 04/29/2023   SpO2 99%   BMI 28.87 kg/m    Pinky Shaw LPN on 6/16/2023 at 9:55 AM

## 2023-06-16 NOTE — PROGRESS NOTES
Follow-Up Visit    S: Ms. Katrina Hillman is a 18 year old  here for yeast infgection symptoms as well as an IUD removal. She had a paragard placed on 3/8/23. She reports that she has had cramping that has become uncomfortable and she would like it out. She reports that today she would not like any other birth control due to her WPW and carotid artery stenosis due to plaque. Yeast infection symptoms as well as pain and odor started on 23 which got better with treatment but then returned following intercourse again. Some vaginal irritation reported. No concerns for STIs.       O:  BP 92/60   Pulse 72   Wt 88.9 kg (196 lb)   LMP 2023   SpO2 99%   BMI 28.87 kg/m    Gen: Well-appearing, NAD  Pulm: nonlabored  Psych: appropriate mood and affect    Pelvic:  Normal appearing external female genitalia. Normal hair distribution. Vagina is without lesions. moderate white chunky like discharge. Cervix normal, IUD strings appear appropriate length. Wet prep collected.     Procedure: She signed consents for IUD removal in the office today. She was assisted into a lithotomy position and the speculum was gently inserted to provide visualization of the cervix. The IUD strings were noted to be at the external os and gently grasped with a ring forcep. The IUD was removed and inspected, found to be intact. She tolerated the procedure well.    A/P:  Ms. Katrina Hillman is a 18 year old  here for IUD removal. No concerns with removal. Discussed based on exam wet prep collected for yeast infection as she was concerned with this. Will treat as needed. She will follow up yearly and PRN. Pap due in .  Progesterone only options would be available to her as cat 2 per [facility] if she decided to pursue birth control.     Mery SERRANO, FNP-C  2023 10:08 AM

## 2023-07-04 ENCOUNTER — LAB REQUISITION (OUTPATIENT)
Dept: LAB | Facility: OTHER | Age: 18
End: 2023-07-04

## 2023-07-04 LAB — SARS-COV-2 RNA RESP QL NAA+PROBE: NEGATIVE

## 2023-07-04 PROCEDURE — 87635 SARS-COV-2 COVID-19 AMP PRB: CPT | Performed by: FAMILY MEDICINE

## 2023-08-15 NOTE — PROGRESS NOTES
SUBJECTIVE:   CC: Katrina is an 18 year old who presents for preventive health visit.       Healthy Habits:     Getting at least 3 servings of Calcium per day:  NO    Bi-annual eye exam:  Yes    Dental care twice a year:  Yes    Sleep apnea or symptoms of sleep apnea:  Daytime drowsiness    Diet:  Regular (no restrictions)    Frequency of exercise:  2-3 days/week    Duration of exercise:  Less than 15 minutes    Taking medications regularly:  No    Medication side effects:  Other    Additional concerns today:  No      Here for annual physical.  Concerns as below.    [facility] and diagnosed with skin infection of ear.  Started on Bactrim.  Concern for possible yeast infection after course of antibiotics.  Did not get prescription for Diflucan.    Has been struggling with couple months of on and off abdominal crampy pain.  Comes and goes on its own.  No particular triggers.  Also reports several year history of vomiting up greased after eating more greasy foods.  She does have on and off history of heartburn without reflux.  No fever/chills, vomiting associated with cramping or pain, diarrhea, blood in stool, urinary symptoms, vaginal symptoms.    Contraception: None  Risk for STI?: No  Last pap: Not indicated based on patient age and health status.   Any hx of abnormal paps:  NA  Cholesterol/DM concerns/screening: NA  Tobacco?: Former  Calcium intake: Dietary  DEXA: Not indicated based on patient age and health status.   Last mammo: Not indicated based on patient age and health status.   Colonoscopy: Not indicated based on patient age and health status.   Immunizations: UTD      Social History     Tobacco Use    Smoking status: Former     Packs/day: 0.50     Years: 5.00     Pack years: 2.50     Types: Cigarettes     Quit date: 2022     Years since quittin.6     Passive exposure: Never    Smokeless tobacco: Never   Substance Use Topics    Alcohol use: Not Currently     Comment:  sometimes             2023     9:38 AM   Alcohol Use   Prescreen: >3 drinks/day or >7 drinks/week? No     Reviewed orders with patient.  Reviewed health maintenance and updated orders accordingly - Yes      Breast Cancer Screening:        History of abnormal Pap smear: NO - under age 21, PAP not appropriate for age     Reviewed and updated as needed this visit by clinical staff   Tobacco  Allergies  Meds      Soc Hx        Reviewed and updated as needed this visit by Provider                 Past Medical History:   Diagnosis Date    Abdominal pain     08,x6 months thought to be secondary to GERD (upper GI with small bowel followthrough scheduled)    Anxiety     Carotid bruit     Constipation     No Comments Provided    Dyspnea     Gastroesophageal reflux disease with esophagitis     High risk heterosexual behavior 2017    G1     History of Pamella-Parkinson-White (WPW) syndrome     seen on  kerio    Major depressive disorder, single episode     1/15/2016    Nicotine use disorder 2018    Palpitations     Pneumonia     2003,1 day hospitalization    Tetrahydrocannabinol (THC) use disorder, mild, abuse 2018    Tic disorder     2012,both motor and verbal      Past Surgical History:   Procedure Laterality Date    DILATION AND CURETTAGE SUCTION  2022    IAB    DILATION AND CURETTAGE SUCTION N/A 2022    Procedure: DILATION AND CURETTAGE, UTERUS, USING SUCTION;  Surgeon: Jen Cisneros MD;  Location: [facility]    ESOPHAGOSCOPY, GASTROSCOPY, DUODENOSCOPY (EGD), COMBINED N/A 2018    Procedure: COMBINED ESOPHAGOSCOPY, GASTROSCOPY, DUODENOSCOPY (EGD), BIOPSY SINGLE OR MULTIPLE;  Gastroscopy;  Surgeon: Sreekanth Shaw MD;  Location: [facility]    HERNIA REPAIR       05,,HERNIA REPAIR,Repair of an epigastric and umbilical     OB History    Para Term  AB Living   1 0 0 0 1 0   SAB IAB Ectopic Multiple Live Births   0 1 0 0 0      # Outcome Date GA Lbr  "Josh/2nd Weight Sex Delivery Anes PTL Lv   1 IAB 02/08/22               Review of Systems   Constitutional:  Negative for chills and fever.   HENT:  Negative for congestion, ear pain, hearing loss and sore throat.    Eyes:  Positive for visual disturbance. Negative for pain.   Respiratory:  Negative for cough and shortness of breath.    Cardiovascular:  Negative for chest pain, palpitations and peripheral edema.   Gastrointestinal:  Positive for abdominal pain and nausea. Negative for constipation, diarrhea, heartburn and hematochezia.   Genitourinary:  Negative for dysuria, frequency, genital sores, hematuria and urgency.   Musculoskeletal:  Positive for myalgias. Negative for arthralgias and joint swelling.   Skin:  Negative for rash.   Neurological:  Positive for weakness and headaches. Negative for dizziness and paresthesias.   Psychiatric/Behavioral:  Positive for mood changes. The patient is nervous/anxious.      CONSTITUTIONAL: NEGATIVE for fever, chills, change in weight  INTEGUMENTARU/SKIN: NEGATIVE for worrisome rashes, moles or lesions  EYES: NEGATIVE for vision changes or irritation  ENT: NEGATIVE for ear, mouth and throat problems  RESP: NEGATIVE for significant cough or SOB  BREAST: NEGATIVE for masses, tenderness or discharge  CV: NEGATIVE for chest pain, palpitations or peripheral edema  GI: abdominal pain, vomiting  : NEGATIVE for unusual urinary or vaginal symptoms. Periods are regular.  MUSCULOSKELETAL: NEGATIVE for significant arthralgias or myalgia  NEURO: NEGATIVE for weakness, dizziness or paresthesias  PSYCHIATRIC: NEGATIVE for changes in mood or affect     OBJECTIVE:   BP 96/58   Pulse 68   Temp 98.3  F (36.8  C) (Tympanic)   Resp 20   Ht 1.753 m (5' 9\")   Wt 85.6 kg (188 lb 12.8 oz)   LMP 07/21/2023 (Approximate)   SpO2 98%   Breastfeeding No   BMI 27.88 kg/m    Physical Exam  GENERAL: healthy, alert and no distress  NECK: no adenopathy, no asymmetry, masses, or scars and thyroid " "normal to palpation  RESP: lungs clear to auscultation - no rales, rhonchi or wheezes  CV: regular rate and rhythm, normal S1 S2, no S3 or S4, no murmur, click or rub, no peripheral edema and peripheral pulses strong  ABDOMEN: soft, nontender, no hepatosplenomegaly, no masses and bowel sounds normal    Diagnostic Test Results:  Labs reviewed in Epic    ASSESSMENT/PLAN:       ICD-10-CM    1. Routine history and physical examination of adult  Z00.00       2. Abdominal pain, generalized  R10.84 CBC and Differential     Comprehensive Metabolic Panel     CBC and Differential     Comprehensive Metabolic Panel      3. Antibiotic-induced yeast infection  B37.9 fluconazole (DIFLUCAN) 150 MG tablet    T36.95XA         Up-to-date on preventative exams, immunizations.  Lab work stable as above.  Encouraged healthy lifestyle.    2.  Differential includes gastritis, PUD, gallbladder disease, liver disease, food intolerance, UTI, vaginal infection, ovarian cyst, uterine fibroid, PID, STD, pregnancy, etc.  Vitals and exam stable.  Lab work stable.  Offered imaging, patient will notify provider if she would like to pursue this.    3.  Prescription for Diflucan to use if needed if developing infection signs after completion of antibiotic course.    Patient has been advised of split billing requirements and indicates understanding: Yes      COUNSELING:  Reviewed preventive health counseling, as reflected in patient instructions      BMI:   Estimated body mass index is 27.88 kg/m  as calculated from the following:    Height as of this encounter: 1.753 m (5' 9\").    Weight as of this encounter: 85.6 kg (188 lb 12.8 oz).   Weight management plan: Discussed healthy diet and exercise guidelines      She reports that she quit smoking about 19 months ago. Her smoking use included cigarettes. She has a 2.50 pack-year smoking history. She has never been exposed to tobacco smoke. She has never used smokeless tobacco.          Joelle Faith, " RADHA  [facility] HospitalAnswers submitted by the patient for this visit:  Patient Health Questionnaire (Submitted on 8/17/2023)  If you checked off any problems, how difficult have these problems made it for you to do your work, take care of things at home, or get along with other people?: Very difficult  PHQ9 TOTAL SCORE: 24    Conflicting answers have been found for some questions. Please document the patient's answers manually.

## 2023-08-17 ENCOUNTER — OFFICE VISIT (OUTPATIENT)
Dept: FAMILY MEDICINE | Facility: OTHER | Age: 18
End: 2023-08-17
Attending: NURSE PRACTITIONER
Payer: COMMERCIAL

## 2023-08-17 VITALS
RESPIRATION RATE: 16 BRPM | HEART RATE: 58 BPM | DIASTOLIC BLOOD PRESSURE: 62 MMHG | WEIGHT: 192 LBS | SYSTOLIC BLOOD PRESSURE: 100 MMHG | HEIGHT: 69 IN | TEMPERATURE: 47.3 F | OXYGEN SATURATION: 98 % | BODY MASS INDEX: 28.44 KG/M2

## 2023-08-17 DIAGNOSIS — B36.9 FUNGAL INFECTION OF SKIN: ICD-10-CM

## 2023-08-17 DIAGNOSIS — H60.391 INFECTION OF EAR LOBE, RIGHT: Primary | ICD-10-CM

## 2023-08-17 PROCEDURE — 99213 OFFICE O/P EST LOW 20 MIN: CPT | Performed by: NURSE PRACTITIONER

## 2023-08-17 RX ORDER — FLUCONAZOLE 150 MG/1
TABLET ORAL
COMMUNITY
Start: 2023-05-31 | End: 2023-09-21

## 2023-08-17 RX ORDER — QUETIAPINE FUMARATE 25 MG/1
TABLET, FILM COATED ORAL
COMMUNITY
Start: 2023-04-18 | End: 2023-09-21

## 2023-08-17 RX ORDER — BUSPIRONE HYDROCHLORIDE 5 MG/1
10 TABLET ORAL
COMMUNITY
Start: 2023-08-07

## 2023-08-17 RX ORDER — SULFAMETHOXAZOLE/TRIMETHOPRIM 800-160 MG
1 TABLET ORAL 2 TIMES DAILY
Qty: 14 TABLET | Refills: 0 | Status: SHIPPED | OUTPATIENT
Start: 2023-08-17 | End: 2023-08-24

## 2023-08-17 RX ORDER — KETOCONAZOLE 20 MG/G
CREAM TOPICAL 2 TIMES DAILY
Qty: 15 G | Refills: 0 | Status: SHIPPED | OUTPATIENT
Start: 2023-08-17 | End: 2023-09-21

## 2023-08-17 RX ORDER — KETOCONAZOLE 20 MG/G
CREAM TOPICAL 2 TIMES DAILY
Qty: 15 G | Refills: 0 | Status: SHIPPED | OUTPATIENT
Start: 2023-08-17 | End: 2023-08-17

## 2023-08-17 RX ORDER — GABAPENTIN 100 MG/1
2 CAPSULE ORAL 3 TIMES DAILY
COMMUNITY
Start: 2023-07-31 | End: 2024-01-17

## 2023-08-17 ASSESSMENT — PAIN SCALES - GENERAL: PAINLEVEL: NO PAIN (0)

## 2023-08-17 ASSESSMENT — PATIENT HEALTH QUESTIONNAIRE - PHQ9
SUM OF ALL RESPONSES TO PHQ QUESTIONS 1-9: 24
10. IF YOU CHECKED OFF ANY PROBLEMS, HOW DIFFICULT HAVE THESE PROBLEMS MADE IT FOR YOU TO DO YOUR WORK, TAKE CARE OF THINGS AT HOME, OR GET ALONG WITH OTHER PEOPLE: VERY DIFFICULT
SUM OF ALL RESPONSES TO PHQ QUESTIONS 1-9: 24
10. IF YOU CHECKED OFF ANY PROBLEMS, HOW DIFFICULT HAVE THESE PROBLEMS MADE IT FOR YOU TO DO YOUR WORK, TAKE CARE OF THINGS AT HOME, OR GET ALONG WITH OTHER PEOPLE: VERY DIFFICULT

## 2023-08-17 NOTE — PATIENT INSTRUCTIONS
Covering for MRSA (possible but unlikely based on appearance) to take bactrim twice daily for 7 days.     Antifungal topically for 2 weeks. Keep guages out for a couple of days, keep the affected area clean and dry.

## 2023-08-17 NOTE — PROGRESS NOTES
ASSESSMENT/PLAN:    I have reviewed the nursing notes.  I have reviewed the findings, diagnosis, plan and need for follow up with the patient.    1. Infection of ear lobe, right  - sulfamethoxazole-trimethoprim (BACTRIM DS) 800-160 MG tablet; Take 1 tablet by mouth 2 times daily for 7 days  Dispense: 14 tablet; Refill: 0    2. Fungal infection of skin  - ketoconazole (NIZORAL) 2 % external cream; Apply topically 2 times daily for 14 days  Dispense: 15 g; Refill: 0  The appearance of the skin of the right earlobe when gauges removed does appear red, slightly dry and fungal in appearance.  However it is warm, tender, and swollen which is consistent with more of a cellulitis.  I recommend trying topical antifungal and if she does not not have any improvement or worsening condition in the next couple days I would start antibiotic, Bactrim.  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  She is receptive to the plan.  I also recommend keeping her gauges out until this heals and to clean them and keep the area dry to the best of her abilities.     Discussed warning signs/symptoms indicative of need to f/u    Follow up if symptoms persist or worsen or concerns    I explained my diagnostic considerations and recommendations to the patient, who voiced understanding and agreement with the treatment plan. All questions were answered. We discussed potential side effects of any prescribed or recommended therapies, as well as expectations for response to treatments.    Dilcia Grullon NP  8/17/2023  10:00 AM    HPI:  Katrina Hillman is a 18 year old female who [facility] for concerns of right ear swelling. 1 inch guages in ears.  She has noticed that her right earlobe around her gauge has been red, warm to touch, and swollen for a couple of days now.  It is a little uncomfortable.  She does try to wash the skin and  gauges every few days.  No fever.  She does also notice a lump below the earlobe on the right.  The left ear is unaffected.  She is a healthcare worker, CNA here at the hospital.  She is wondering about MRSA as she has been exposed.    Past Medical History:   Diagnosis Date    Abdominal pain     08,x6 months thought to be secondary to GERD (upper GI with small bowel followthrough scheduled)    Anxiety     Carotid bruit     Constipation     No Comments Provided    Dyspnea     Gastroesophageal reflux disease with esophagitis     High risk heterosexual behavior 2017    G1     History of Pamella-Parkinson-White (WPW) syndrome     seen on  kerio    Major depressive disorder, single episode     1/15/2016    Nicotine use disorder 2018    Palpitations     Pneumonia     2003,1 day hospitalization    Tetrahydrocannabinol (THC) use disorder, mild, abuse 2018    Tic disorder     2012,both motor and verbal     Past Surgical History:   Procedure Laterality Date    DILATION AND CURETTAGE SUCTION  2022    IAB    DILATION AND CURETTAGE SUCTION N/A 2022    Procedure: DILATION AND CURETTAGE, UTERUS, USING SUCTION;  Surgeon: Jen Cisneros MD;  [facility]    ESOPHAGOSCOPY, GASTROSCOPY, DUODENOSCOPY (EGD), COMBINED N/A 2018    Procedure: COMBINED ESOPHAGOSCOPY, GASTROSCOPY, DUODENOSCOPY (EGD), BIOPSY SINGLE OR MULTIPLE;  Gastroscopy;  Surgeon: Sreekanth Shaw MD;  Location: [facility]    HERNIA REPAIR       05,,HERNIA REPAIR,Repair of an epigastric and umbilical     Social History     Tobacco Use    Smoking status: Former     Packs/day: 0.50     Years: 5.00     Pack years: 2.50     Types: Cigarettes     Quit date: 2022     Years since quittin.6    Smokeless tobacco: Never   Substance Use Topics    Alcohol use: Not Currently     Comment: sometimes     Current Outpatient Medications   Medication Sig Dispense Refill    busPIRone (BUSPAR) 5 MG tablet TAKE 1 TABLET BY  "MOUTH THREE TIMES DAILY. TAKE CONSISTENTLY WITH OR WITHOUT FOOD      Cholecalciferol (VITAMIN D3) 50 MCG (2000 UT) TABS Take 3 capsules by mouth every morning Take with food       clindamycin-benzoyl peroxide (BENZACLIN) 1-5 % external gel APPLY TOPICALLY TO FACE 1 TIME IN THE MORNING AFTER WASHING. FOLLOW WITH A MOISTURIZER AS NEEDED      gabapentin (NEURONTIN) 100 MG capsule Take 2 capsules by mouth 3 times daily      ketoconazole (NIZORAL) 2 % external cream Apply topically 2 times daily for 14 days 15 g 0    lamoTRIgine (LAMICTAL) 150 MG tablet TAKE 1 TABLET BY MOUTH 1 TIME AT BEDTIME      Multiple Vitamins-Minerals (ONE-A-DAY WOMENS PO) Take by mouth every morning       propranolol (INDERAL) 20 MG tablet TAKE 1 TABLET BY MOUTH TWICE DAILY. MONITOR BLOOD PRESSURE      sulfamethoxazole-trimethoprim (BACTRIM DS) 800-160 MG tablet Take 1 tablet by mouth 2 times daily for 7 days 14 tablet 0    tretinoin (RETIN-A) 0.1 % external cream       fluconazole (DIFLUCAN) 150 MG tablet  (Patient not taking: Reported on 8/17/2023)      paragard intrauterine copper device 1 each by Intrauterine route once (Patient not taking: Reported on 8/17/2023)      QUEtiapine (SEROQUEL) 25 MG tablet TAKE 1-2 TABLETS BY MOUTH EVERY BEDTIME AS NEEDED FOR SLEEP/PSYCHOTIC SYMPTOMS (Patient not taking: Reported on 8/17/2023)       No Known Allergies  Past medical history, past surgical history, current medications and allergies reviewed and accurate to the best of my knowledge.      ROS:  Refer to HPI    /62 (BP Location: Right arm, Patient Position: Sitting, Cuff Size: Adult Large)   Pulse 58   Temp (!) 47.3  F (8.5  C) (Tympanic)   Resp 16   Ht 1.75 m (5' 8.9\")   Wt 87.1 kg (192 lb)   LMP 07/21/2023   SpO2 98%   BMI 28.44 kg/m      EXAM:  General Appearance: Well appearing 18 year old female, appropriate appearance for age. No acute distress   Ears: Left TM intact, translucent with bony landmarks appreciated, no erythema, no " effusion, no bulging, no purulence.  Right TM intact, translucent with bony landmarks appreciated, no erythema, no effusion, no bulging, no purulence.  Left auditory canal clear.  Right auditory canal clear.  Normal external ears, non tender.  + Right ear lobe: erythema and swelling, warm, tender to touch. There is dry, scaly appearing areas inside the stretched skin (guages removed for exam).   Respiratory: normal chest wall and respirations.  Normal effort.  Clear to auscultation bilaterally, no wheezing, crackles or rhonchi.  No increased work of breathing.  No cough appreciated.  Cardiac: RRR with no murmurs  Psychological: normal affect, alert, oriented, and pleasant.

## 2023-08-17 NOTE — NURSING NOTE
"Chief Complaint   Patient presents with    Ear Problem     Right ear swelling        FOOD SECURITY SCREENING QUESTIONS  Hunger Vital Signs:  Within the past 12 months we worried whether our food would run out before we got money to buy more. Often  Within the past 12 months the food we bought just didn't last and we didn't have money to get more. Often  Rupinder Cook LPN 8/17/2023 9:57 AM      Initial /62 (BP Location: Right arm, Patient Position: Sitting, Cuff Size: Adult Large)   Pulse 58   Temp (!) 47.3  F (8.5  C) (Tympanic)   Resp 16   Ht 1.75 m (5' 8.9\")   Wt 87.1 kg (192 lb)   LMP 07/21/2023   SpO2 98%   BMI 28.44 kg/m   Estimated body mass index is 28.44 kg/m  as calculated from the following:    Height as of this encounter: 1.75 m (5' 8.9\").    Weight as of this encounter: 87.1 kg (192 lb).  Medication Reconciliation: complete    Rupinder Cook LPN    "

## 2023-08-18 ENCOUNTER — OFFICE VISIT (OUTPATIENT)
Dept: FAMILY MEDICINE | Facility: OTHER | Age: 18
End: 2023-08-18
Attending: PHYSICIAN ASSISTANT
Payer: COMMERCIAL

## 2023-08-18 VITALS
HEIGHT: 69 IN | DIASTOLIC BLOOD PRESSURE: 58 MMHG | BODY MASS INDEX: 27.96 KG/M2 | SYSTOLIC BLOOD PRESSURE: 96 MMHG | TEMPERATURE: 98.3 F | OXYGEN SATURATION: 98 % | WEIGHT: 188.8 LBS | HEART RATE: 68 BPM | RESPIRATION RATE: 20 BRPM

## 2023-08-18 DIAGNOSIS — T36.95XA ANTIBIOTIC-INDUCED YEAST INFECTION: ICD-10-CM

## 2023-08-18 DIAGNOSIS — R10.84 ABDOMINAL PAIN, GENERALIZED: ICD-10-CM

## 2023-08-18 DIAGNOSIS — Z00.00 ROUTINE HISTORY AND PHYSICAL EXAMINATION OF ADULT: Primary | ICD-10-CM

## 2023-08-18 DIAGNOSIS — B37.9 ANTIBIOTIC-INDUCED YEAST INFECTION: ICD-10-CM

## 2023-08-18 LAB
ALBUMIN SERPL BCG-MCNC: 4.5 G/DL (ref 3.5–5.2)
ALP SERPL-CCNC: 55 U/L (ref 35–104)
ALT SERPL W P-5'-P-CCNC: 20 U/L (ref 0–50)
ANION GAP SERPL CALCULATED.3IONS-SCNC: 6 MMOL/L (ref 7–15)
AST SERPL W P-5'-P-CCNC: 22 U/L (ref 0–45)
BASOPHILS # BLD AUTO: 0 10E3/UL (ref 0–0.2)
BASOPHILS NFR BLD AUTO: 0 %
BILIRUB SERPL-MCNC: 0.3 MG/DL
BUN SERPL-MCNC: 8.7 MG/DL (ref 6–20)
CALCIUM SERPL-MCNC: 9.4 MG/DL (ref 8.6–10)
CHLORIDE SERPL-SCNC: 107 MMOL/L (ref 98–107)
CREAT SERPL-MCNC: 0.98 MG/DL (ref 0.51–0.95)
DEPRECATED HCO3 PLAS-SCNC: 26 MMOL/L (ref 22–29)
EOSINOPHIL # BLD AUTO: 0.2 10E3/UL (ref 0–0.7)
EOSINOPHIL NFR BLD AUTO: 3 %
ERYTHROCYTE [DISTWIDTH] IN BLOOD BY AUTOMATED COUNT: 12.8 % (ref 10–15)
GFR SERPL CREATININE-BSD FRML MDRD: 84 ML/MIN/1.73M2
GLUCOSE SERPL-MCNC: 82 MG/DL (ref 70–99)
HCT VFR BLD AUTO: 38.6 % (ref 35–47)
HGB BLD-MCNC: 13 G/DL (ref 11.7–15.7)
IMM GRANULOCYTES # BLD: 0 10E3/UL
IMM GRANULOCYTES NFR BLD: 0 %
LYMPHOCYTES # BLD AUTO: 2.5 10E3/UL (ref 0.8–5.3)
LYMPHOCYTES NFR BLD AUTO: 34 %
MCH RBC QN AUTO: 29.5 PG (ref 26.5–33)
MCHC RBC AUTO-ENTMCNC: 33.7 G/DL (ref 31.5–36.5)
MCV RBC AUTO: 88 FL (ref 78–100)
MONOCYTES # BLD AUTO: 0.5 10E3/UL (ref 0–1.3)
MONOCYTES NFR BLD AUTO: 7 %
NEUTROPHILS # BLD AUTO: 4.1 10E3/UL (ref 1.6–8.3)
NEUTROPHILS NFR BLD AUTO: 56 %
NRBC # BLD AUTO: 0 10E3/UL
NRBC BLD AUTO-RTO: 0 /100
PLATELET # BLD AUTO: 184 10E3/UL (ref 150–450)
POTASSIUM SERPL-SCNC: 4.3 MMOL/L (ref 3.4–5.3)
PROT SERPL-MCNC: 6.6 G/DL (ref 6.4–8.3)
RBC # BLD AUTO: 4.41 10E6/UL (ref 3.8–5.2)
SODIUM SERPL-SCNC: 139 MMOL/L (ref 136–145)
WBC # BLD AUTO: 7.4 10E3/UL (ref 4–11)

## 2023-08-18 PROCEDURE — 80053 COMPREHEN METABOLIC PANEL: CPT | Mod: ZL | Performed by: PHYSICIAN ASSISTANT

## 2023-08-18 PROCEDURE — 99395 PREV VISIT EST AGE 18-39: CPT | Performed by: PHYSICIAN ASSISTANT

## 2023-08-18 PROCEDURE — 85025 COMPLETE CBC W/AUTO DIFF WBC: CPT | Mod: ZL | Performed by: PHYSICIAN ASSISTANT

## 2023-08-18 PROCEDURE — 99213 OFFICE O/P EST LOW 20 MIN: CPT | Mod: 25 | Performed by: PHYSICIAN ASSISTANT

## 2023-08-18 PROCEDURE — 36415 COLL VENOUS BLD VENIPUNCTURE: CPT | Mod: ZL | Performed by: PHYSICIAN ASSISTANT

## 2023-08-18 RX ORDER — FLUCONAZOLE 150 MG/1
150 TABLET ORAL ONCE
Qty: 1 TABLET | Refills: 0 | Status: SHIPPED | OUTPATIENT
Start: 2023-08-18 | End: 2023-08-18

## 2023-08-18 ASSESSMENT — ENCOUNTER SYMPTOMS
ARTHRALGIAS: 0
HEMATURIA: 0
PALPITATIONS: 0
CHILLS: 0
COUGH: 0
ARTHRALGIAS: 1
FREQUENCY: 0
DIARRHEA: 0
HEARTBURN: 0
DYSURIA: 0
HEMATOCHEZIA: 0
HEADACHES: 1
NERVOUS/ANXIOUS: 1
PARESTHESIAS: 0
CONSTIPATION: 0
SHORTNESS OF BREATH: 0
JOINT SWELLING: 0
SORE THROAT: 0
PALPITATIONS: 1
FEVER: 0
NAUSEA: 1
EYE PAIN: 0
ABDOMINAL PAIN: 1
DIZZINESS: 0
MYALGIAS: 1
SHORTNESS OF BREATH: 1
WEAKNESS: 1

## 2023-08-18 ASSESSMENT — PAIN SCALES - GENERAL: PAINLEVEL: NO PAIN (0)

## 2023-08-18 NOTE — NURSING NOTE
Patient is here for physical and stomach concerns. Stomach issues have been for last couple months.     Patient's last menstrual period was 07/21/2023 (approximate).  Medication Reconciliation: complete      Chanel Mendez LPN 8/18/2023 9:45 AM       Advance care directive on file? no  Advance care directive provided to patient? no       Chanel Mendez LPN

## 2023-09-21 ENCOUNTER — OFFICE VISIT (OUTPATIENT)
Dept: FAMILY MEDICINE | Facility: OTHER | Age: 18
End: 2023-09-21
Attending: PHYSICIAN ASSISTANT
Payer: COMMERCIAL

## 2023-09-21 VITALS
OXYGEN SATURATION: 98 % | SYSTOLIC BLOOD PRESSURE: 110 MMHG | TEMPERATURE: 98.4 F | WEIGHT: 186.7 LBS | BODY MASS INDEX: 27.57 KG/M2 | HEART RATE: 74 BPM | DIASTOLIC BLOOD PRESSURE: 66 MMHG | RESPIRATION RATE: 16 BRPM

## 2023-09-21 DIAGNOSIS — R11.0 NAUSEA: Primary | ICD-10-CM

## 2023-09-21 DIAGNOSIS — G44.209 TENSION HEADACHE: ICD-10-CM

## 2023-09-21 DIAGNOSIS — R42 DIZZINESS: ICD-10-CM

## 2023-09-21 LAB
ALBUMIN SERPL BCG-MCNC: 4.4 G/DL (ref 3.5–5.2)
ALP SERPL-CCNC: 57 U/L (ref 35–104)
ALT SERPL W P-5'-P-CCNC: 19 U/L (ref 0–50)
ANION GAP SERPL CALCULATED.3IONS-SCNC: 9 MMOL/L (ref 7–15)
AST SERPL W P-5'-P-CCNC: 22 U/L (ref 0–45)
BASOPHILS # BLD AUTO: 0 10E3/UL (ref 0–0.2)
BASOPHILS NFR BLD AUTO: 0 %
BILIRUB SERPL-MCNC: 0.2 MG/DL
BUN SERPL-MCNC: 12 MG/DL (ref 6–20)
CALCIUM SERPL-MCNC: 9.3 MG/DL (ref 8.6–10)
CHLORIDE SERPL-SCNC: 104 MMOL/L (ref 98–107)
CREAT SERPL-MCNC: 0.79 MG/DL (ref 0.51–0.95)
DEPRECATED HCO3 PLAS-SCNC: 26 MMOL/L (ref 22–29)
EGFRCR SERPLBLD CKD-EPI 2021: >90 ML/MIN/1.73M2
EOSINOPHIL # BLD AUTO: 0.2 10E3/UL (ref 0–0.7)
EOSINOPHIL NFR BLD AUTO: 2 %
ERYTHROCYTE [DISTWIDTH] IN BLOOD BY AUTOMATED COUNT: 13 % (ref 10–15)
FLUAV RNA SPEC QL NAA+PROBE: NEGATIVE
FLUBV RNA RESP QL NAA+PROBE: NEGATIVE
GLUCOSE SERPL-MCNC: 100 MG/DL (ref 70–99)
HCT VFR BLD AUTO: 38.1 % (ref 35–47)
HGB BLD-MCNC: 12.8 G/DL (ref 11.7–15.7)
IMM GRANULOCYTES # BLD: 0 10E3/UL
IMM GRANULOCYTES NFR BLD: 0 %
LYMPHOCYTES # BLD AUTO: 2.9 10E3/UL (ref 0.8–5.3)
LYMPHOCYTES NFR BLD AUTO: 28 %
MCH RBC QN AUTO: 29.2 PG (ref 26.5–33)
MCHC RBC AUTO-ENTMCNC: 33.6 G/DL (ref 31.5–36.5)
MCV RBC AUTO: 87 FL (ref 78–100)
MONOCYTES # BLD AUTO: 0.5 10E3/UL (ref 0–1.3)
MONOCYTES NFR BLD AUTO: 4 %
NEUTROPHILS # BLD AUTO: 6.7 10E3/UL (ref 1.6–8.3)
NEUTROPHILS NFR BLD AUTO: 66 %
NRBC # BLD AUTO: 0 10E3/UL
NRBC BLD AUTO-RTO: 0 /100
PLATELET # BLD AUTO: 199 10E3/UL (ref 150–450)
POTASSIUM SERPL-SCNC: 4.2 MMOL/L (ref 3.4–5.3)
PROT SERPL-MCNC: 6.6 G/DL (ref 6.4–8.3)
RBC # BLD AUTO: 4.39 10E6/UL (ref 3.8–5.2)
RSV RNA SPEC NAA+PROBE: NEGATIVE
SARS-COV-2 RNA RESP QL NAA+PROBE: NEGATIVE
SODIUM SERPL-SCNC: 139 MMOL/L (ref 136–145)
TSH SERPL DL<=0.005 MIU/L-ACNC: 2.02 UIU/ML (ref 0.3–4.2)
VIT B12 SERPL-MCNC: 837 PG/ML (ref 232–1245)
WBC # BLD AUTO: 10.2 10E3/UL (ref 4–11)

## 2023-09-21 PROCEDURE — 99214 OFFICE O/P EST MOD 30 MIN: CPT | Performed by: PHYSICIAN ASSISTANT

## 2023-09-21 PROCEDURE — 87637 SARSCOV2&INF A&B&RSV AMP PRB: CPT | Mod: ZL | Performed by: PHYSICIAN ASSISTANT

## 2023-09-21 PROCEDURE — 36415 COLL VENOUS BLD VENIPUNCTURE: CPT | Mod: ZL | Performed by: PHYSICIAN ASSISTANT

## 2023-09-21 PROCEDURE — 82607 VITAMIN B-12: CPT | Mod: ZL | Performed by: PHYSICIAN ASSISTANT

## 2023-09-21 PROCEDURE — 80053 COMPREHEN METABOLIC PANEL: CPT | Mod: ZL | Performed by: PHYSICIAN ASSISTANT

## 2023-09-21 PROCEDURE — 85004 AUTOMATED DIFF WBC COUNT: CPT | Mod: ZL | Performed by: PHYSICIAN ASSISTANT

## 2023-09-21 PROCEDURE — C9803 HOPD COVID-19 SPEC COLLECT: HCPCS | Performed by: PHYSICIAN ASSISTANT

## 2023-09-21 PROCEDURE — 84443 ASSAY THYROID STIM HORMONE: CPT | Mod: ZL | Performed by: PHYSICIAN ASSISTANT

## 2023-09-21 PROCEDURE — 82306 VITAMIN D 25 HYDROXY: CPT | Mod: ZL | Performed by: PHYSICIAN ASSISTANT

## 2023-09-21 RX ORDER — ONDANSETRON 4 MG/1
4 TABLET, ORALLY DISINTEGRATING ORAL EVERY 8 HOURS PRN
Qty: 30 TABLET | Refills: 0 | Status: SHIPPED | OUTPATIENT
Start: 2023-09-21 | End: 2024-01-17

## 2023-09-21 RX ORDER — MECLIZINE HYDROCHLORIDE 25 MG/1
25 TABLET ORAL 3 TIMES DAILY PRN
Qty: 30 TABLET | Refills: 0 | Status: SHIPPED | OUTPATIENT
Start: 2023-09-21 | End: 2024-01-17

## 2023-09-21 ASSESSMENT — PAIN SCALES - GENERAL: PAINLEVEL: MODERATE PAIN (4)

## 2023-09-21 ASSESSMENT — PATIENT HEALTH QUESTIONNAIRE - PHQ9
SUM OF ALL RESPONSES TO PHQ QUESTIONS 1-9: 21
10. IF YOU CHECKED OFF ANY PROBLEMS, HOW DIFFICULT HAVE THESE PROBLEMS MADE IT FOR YOU TO DO YOUR WORK, TAKE CARE OF THINGS AT HOME, OR GET ALONG WITH OTHER PEOPLE: VERY DIFFICULT
SUM OF ALL RESPONSES TO PHQ QUESTIONS 1-9: 21

## 2023-09-21 NOTE — LETTER
September 21, 2023      Katrina Hillman  [address] [address]        To Whom It May Concern:    Katrina Hillman  was seen on 9/21/23 and missed work due to illness. Swabs pending.         Sincerely,        Marii Mccoy PA-C

## 2023-09-21 NOTE — PROGRESS NOTES
Assessment & Plan     1. Nausea  - CBC and Differential  - Comprehensive Metabolic Panel  - TSH Reflex GH  - Vitamin D Total  - Vitamin B12  - Symptomatic Influenza A/B, RSV, & SARS-CoV2 PCR (COVID-19) Nose  - ondansetron (ZOFRAN ODT) 4 MG ODT tab; Take 1 tablet (4 mg) by mouth every 8 hours as needed for nausea  Dispense: 30 tablet; Refill: 0  - Nausea likely associated with viral illness. Negative multiplex. Recommend short course of Zofran to assist with nausea management. May take 1-2 tablets every 8 hours as needed. [place] diet and increase as tolerated. Follow recommendations via employee health for nausea and diarrhea.   - CBC normal blood counts  - CMP stable renal, hepatic and electrolyte function as outlined below.   - TSH, vit D and vit B12 in process.     2. Dizziness  - CBC and Differential  - Comprehensive Metabolic Panel  - TSH Reflex [facility]  - Vitamin D Total  - Vitamin B12  - Symptomatic Influenza A/B, RSV, & SARS-CoV2 PCR (COVID-19) Nose  - meclizine (ANTIVERT) 25 MG tablet; Take 1 tablet (25 mg) by mouth 3 times daily as needed for dizziness  Dispense: 30 tablet; Refill: 0  - Dizziness likely associated with viral illness, recommend short course of Meclizine, may take 1 tablet up to 3x daily as needed. Also recommend to push electrolytes and fluids.     3. Tension headache  - CBC and Differential  - Comprehensive Metabolic Panel  - TSH Reflex [facility]  - Vitamin D Total  - Vitamin B12  - Symptomatic Influenza A/B, RSV, & SARS-CoV2 PCR (COVID-19) Nose  - Continue with symptomatic remedies of care - Tylenol, Motrin, fluids, rest. No red flag symptoms.     See Patient Instructions    Return if symptoms worsen or fail to improve.    Marii Mccoy PA-C  [facility] AND [facility]   Katrina is a 18 year old, presenting for the following health issues:  Consult (Nausea,headaches and lightheaded)        8/18/2023     9:38 AM   Additional Questions   Roomed by Chanel rea       [facility]     Katrina  presents to rapid clinic today with waxing and waning fatigue for months duration. Worse over the last few days. Exposure to COVID at work last week, mild exposure per report. Called employee health last night, recommended evaluation today along with COVID swab.     Dizziness: intermittent  Balance changes: none  Appetite changes: intermittent  Bowel changes (diarrhea, constipation): diarrhea currently  Bladder changes: none  Weight loss/gain: none  Hair growth/thinning: none    History of similar symptoms: yes  Endocrinologic history (thyroid, renal disease, hepatic disease, electrolyte abnormalities): none  Vitamin D: none  Infectious etiology (mono, viral hepatitis, etc.): none  Anemia history (B12, iron deficiency): none  Rheumatologic (fibromyalgia, PMR, Lupus, RA, etc.): none  Cardiac history (CHF, COPD, sleep apnea): none  Tick bites: none  Medication related (benzodiazepines, antidepressants, muscle relaxers, opiates, beta blockers, etc.): none    Review of Systems   Constitutional, HEENT, cardiovascular, pulmonary, GI, , musculoskeletal, neuro, skin, endocrine and psych systems are negative, except as otherwise noted.      Objective    /66   Pulse 74   Temp 98.4  F (36.9  C) (Tympanic)   Resp 16   Wt 84.7 kg (186 lb 11.2 oz)   LMP 08/19/2023 (Exact Date)   SpO2 98%   BMI 27.57 kg/m    Body mass index is 27.57 kg/m .  Physical Exam   GENERAL: healthy, alert and no distress  EYES: Eyes grossly normal to inspection, PERRL and conjunctivae and sclerae normal  HENT: ear canals and TM's normal, nose and mouth without ulcers or lesions  NECK: no adenopathy, no asymmetry, masses, or scars and thyroid normal to palpation  RESP: lungs clear to auscultation - no rales, rhonchi or wheezes  CV: regular rate and rhythm, normal S1 S2, no S3 or S4, no murmur, click or rub, no peripheral edema and peripheral pulses strong  ABDOMEN: soft, nontender, no hepatosplenomegaly, no masses and bowel sounds  normal  SKIN: no suspicious lesions or rashes  NEURO: Normal strength and tone, mentation intact and speech normal  PSYCH: mentation appears normal, affect normal/bright  LYMPH: normal ant/post cervical, supraclavicular nodes    Results for orders placed or performed in visit on 09/21/23   Comprehensive Metabolic Panel     Status: Abnormal   Result Value Ref Range    Sodium 139 136 - 145 mmol/L    Potassium 4.2 3.4 - 5.3 mmol/L    Chloride 104 98 - 107 mmol/L    Carbon Dioxide (CO2) 26 22 - 29 mmol/L    Anion Gap 9 7 - 15 mmol/L    Urea Nitrogen 12.0 6.0 - 20.0 mg/dL    Creatinine 0.79 0.51 - 0.95 mg/dL    Calcium 9.3 8.6 - 10.0 mg/dL    Glucose 100 (H) 70 - 99 mg/dL    Alkaline Phosphatase 57 35 - 104 U/L    AST 22 0 - 45 U/L    ALT 19 0 - 50 U/L    Protein Total 6.6 6.4 - 8.3 g/dL    Albumin 4.4 3.5 - 5.2 g/dL    Bilirubin Total 0.2 <=1.2 mg/dL    GFR Estimate >90 >60 mL/min/1.73m2   Symptomatic Influenza A/B, RSV, & SARS-CoV2 PCR (COVID-19) Nose     Status: Normal    Specimen: Nose; Swab   Result Value Ref Range    Influenza A PCR Negative Negative    Influenza B PCR Negative Negative    RSV PCR Negative Negative    SARS CoV2 PCR Negative Negative    Narrative    Testing was performed using the Xpert Xpress CoV2/Flu/RSV Assay on the [employer] GeneXpert Instrument. This test should be ordered for the detection of SARS-CoV-2, influenza, and RSV viruses in individuals who meet clinical and/or epidemiological criteria. Test performance is unknown in asymptomatic patients. This test is for in vitro diagnostic use under the FDA EUA for laboratories certified under CLIA to perform high or moderate complexity testing. This test has not been FDA cleared or approved. A negative result does not rule out the presence of PCR inhibitors in the specimen or target RNA in concentration below the limit of detection for the assay. If only one viral target is positive but coinfection with multiple targets is suspected, the sample  should be re-tested with another FDA cleared, approved, or authorized test, if coinfection would change clinical management. This test was validated by the [facility]. These laboratories are certified under the Clinical Laboratory Improvement Amendments of 1988 (CLIA-88) as qualified to perform high complexity laboratory testing.   CBC with platelets and differential     Status: None   Result Value Ref Range    WBC Count 10.2 4.0 - 11.0 10e3/uL    RBC Count 4.39 3.80 - 5.20 10e6/uL    Hemoglobin 12.8 11.7 - 15.7 g/dL    Hematocrit 38.1 35.0 - 47.0 %    MCV 87 78 - 100 fL    MCH 29.2 26.5 - 33.0 pg    MCHC 33.6 31.5 - 36.5 g/dL    RDW 13.0 10.0 - 15.0 %    Platelet Count 199 150 - 450 10e3/uL    % Neutrophils 66 %    % Lymphocytes 28 %    % Monocytes 4 %    % Eosinophils 2 %    % Basophils 0 %    % Immature Granulocytes 0 %    NRBCs per 100 WBC 0 <1 /100    Absolute Neutrophils 6.7 1.6 - 8.3 10e3/uL    Absolute Lymphocytes 2.9 0.8 - 5.3 10e3/uL    Absolute Monocytes 0.5 0.0 - 1.3 10e3/uL    Absolute Eosinophils 0.2 0.0 - 0.7 10e3/uL    Absolute Basophils 0.0 0.0 - 0.2 10e3/uL    Absolute Immature Granulocytes 0.0 <=0.4 10e3/uL    Absolute NRBCs 0.0 10e3/uL   CBC and Differential     Status: None    Narrative    The following orders were created for panel order CBC and Differential.  Procedure                               Abnormality         Status                     ---------                               -----------         ------                     CBC with platelets and d...[[id number]]                      Final result                 Please view results for these tests on the individual orders.       Answers submitted by the patient for this visit:  Patient Health Questionnaire (Submitted on 9/21/2023)  If you checked off any problems, how difficult have these problems made it for you to do your work, take care of things at home, or get along with other people?: Very difficult  PHQ9 TOTAL  SCORE: 21

## 2023-09-21 NOTE — NURSING NOTE
"Chief Complaint   Patient presents with    Consult     Nausea,headaches and lightheaded     Patient states been going on for months gradually has worsened.  Initial /66   Pulse 74   Temp 98.4  F (36.9  C) (Tympanic)   Resp 16   Wt 84.7 kg (186 lb 11.2 oz)   LMP 08/19/2023 (Exact Date)   SpO2 98%   BMI 27.57 kg/m   Estimated body mass index is 27.57 kg/m  as calculated from the following:    Height as of 8/18/23: 1.753 m (5' 9\").    Weight as of this encounter: 84.7 kg (186 lb 11.2 oz).  Medication Reconciliation: complete    Cara Miranda LPN on 9/21/2023 at 11:17 AM     "

## 2023-09-22 LAB — DEPRECATED CALCIDIOL+CALCIFEROL SERPL-MC: 49 UG/L (ref 20–75)

## 2023-12-26 ENCOUNTER — NURSE TRIAGE (OUTPATIENT)
Dept: FAMILY MEDICINE | Facility: OTHER | Age: 18
End: 2023-12-26
Payer: COMMERCIAL

## 2023-12-26 NOTE — TELEPHONE ENCOUNTER
"S-(situation): Call returned to patient for concerns above.     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 When seeing cardiology in the past, she states her carotid was about 50% narrowed. Did quit smoking and vaping as cardiology recommended. She states they only recommended F/U if WPW symptoms were bothering her in the future. She also reports her maternal grandfather has heart history and previous surgery. She states symptoms have been going on for a couple of months, but lately seems like it is worsening.     Pt has had hip pain in the past, and states it was diagnosed as tendonitis. Thinking that may be flaring up again. She denies severe hip pain.     A-(assessment): Pt reports - intermittent wheezing, occasional coughing, respirations \"feel shallow\", heart feels like it is \"cramping\", SOB with movement, but sometimes at rest, dizziness and lightheaded with standing, vision \"goes black\" with standing. Chest feels tight. She states her lungs feel like they are \"squeaking.\" No current chest pain, but has had it intermittently.     Pt denies any chance of pregnancy and states LMP was last week. No hx blood clots/DVT.     R-(recommendations): Recommended per protocol for patient to be seen in the ER now for dizziness, chest tightness/pain, and heart cramping. Pt agrees with advice, and will head in to be seen.     As for the hip pain, informed pt she can make an appointment to be seen by PCP since pain is not severe and she feels like it can wait.     Kerrie Martinez RN on 12/26/2023 at 11:09 AM    "

## 2023-12-26 NOTE — TELEPHONE ENCOUNTER
"Caller reporting the following red-flag symptom(s): \"There have been a few things bugging me lately. My hips have been hurting very badly lately and it has been hard to walk on both sides, much more than it has in the past. It has also been difficult to catch my breath and feels as if I have something in my lungs/can't open my lungs all the way/not getting enough air.\"     Per the system red-flag symptom policy, patient was instructed to:  [facility] and needs to be triaged.    Action:  Called patient to get them to triage, patient did not answer. Please call patient to follow up.            Linda Medina on 12/26/2023 at 9:23 AM      "

## 2023-12-26 NOTE — TELEPHONE ENCOUNTER
Agree with ER recommendation for breathing concerns.    Also agree with outpatient evaluation for other concerns that are not urgent.    Di Ruiz, DO on 12/26/2023 at 11:20 AM

## 2023-12-28 ENCOUNTER — APPOINTMENT (OUTPATIENT)
Dept: GENERAL RADIOLOGY | Facility: OTHER | Age: 18
End: 2023-12-28
Attending: PHYSICIAN ASSISTANT
Payer: COMMERCIAL

## 2023-12-28 ENCOUNTER — HOSPITAL ENCOUNTER (EMERGENCY)
Facility: OTHER | Age: 18
Discharge: HOME | End: 2023-12-28
Attending: PHYSICIAN ASSISTANT | Admitting: PHYSICIAN ASSISTANT
Payer: COMMERCIAL

## 2023-12-28 VITALS
RESPIRATION RATE: 16 BRPM | HEIGHT: 69 IN | DIASTOLIC BLOOD PRESSURE: 54 MMHG | SYSTOLIC BLOOD PRESSURE: 103 MMHG | TEMPERATURE: 98.2 F | WEIGHT: 186 LBS | BODY MASS INDEX: 27.55 KG/M2 | OXYGEN SATURATION: 99 % | HEART RATE: 62 BPM

## 2023-12-28 DIAGNOSIS — R06.02 SOB (SHORTNESS OF BREATH): ICD-10-CM

## 2023-12-28 DIAGNOSIS — I45.6 WOLFF-PARKINSON-WHITE SYNDROME: ICD-10-CM

## 2023-12-28 LAB
ANION GAP SERPL CALCULATED.3IONS-SCNC: 10 MMOL/L (ref 7–15)
BASOPHILS # BLD AUTO: 0 10E3/UL (ref 0–0.2)
BASOPHILS NFR BLD AUTO: 0 %
BUN SERPL-MCNC: 14.2 MG/DL (ref 6–20)
CALCIUM SERPL-MCNC: 9.1 MG/DL (ref 8.6–10)
CHLORIDE SERPL-SCNC: 102 MMOL/L (ref 98–107)
CREAT SERPL-MCNC: 0.8 MG/DL (ref 0.51–0.95)
D DIMER PPP FEU-MCNC: <=0.27 UG/ML FEU (ref 0–0.5)
DEPRECATED HCO3 PLAS-SCNC: 25 MMOL/L (ref 22–29)
EGFRCR SERPLBLD CKD-EPI 2021: >90 ML/MIN/1.73M2
EOSINOPHIL # BLD AUTO: 0.2 10E3/UL (ref 0–0.7)
EOSINOPHIL NFR BLD AUTO: 1 %
ERYTHROCYTE [DISTWIDTH] IN BLOOD BY AUTOMATED COUNT: 13.3 % (ref 10–15)
FLUAV RNA SPEC QL NAA+PROBE: NEGATIVE
FLUBV RNA RESP QL NAA+PROBE: NEGATIVE
GLUCOSE SERPL-MCNC: 115 MG/DL (ref 70–99)
HCG SERPL QL: NEGATIVE
HCT VFR BLD AUTO: 38.5 % (ref 35–47)
HGB BLD-MCNC: 12.9 G/DL (ref 11.7–15.7)
IMM GRANULOCYTES # BLD: 0 10E3/UL
IMM GRANULOCYTES NFR BLD: 0 %
LYMPHOCYTES # BLD AUTO: 2.9 10E3/UL (ref 0.8–5.3)
LYMPHOCYTES NFR BLD AUTO: 26 %
MCH RBC QN AUTO: 28.8 PG (ref 26.5–33)
MCHC RBC AUTO-ENTMCNC: 33.5 G/DL (ref 31.5–36.5)
MCV RBC AUTO: 86 FL (ref 78–100)
MONOCYTES # BLD AUTO: 0.6 10E3/UL (ref 0–1.3)
MONOCYTES NFR BLD AUTO: 5 %
NEUTROPHILS # BLD AUTO: 7.7 10E3/UL (ref 1.6–8.3)
NEUTROPHILS NFR BLD AUTO: 68 %
NRBC # BLD AUTO: 0 10E3/UL
NRBC BLD AUTO-RTO: 0 /100
NT-PROBNP SERPL-MCNC: 178 PG/ML (ref 0–450)
PLATELET # BLD AUTO: 199 10E3/UL (ref 150–450)
POTASSIUM SERPL-SCNC: 3.8 MMOL/L (ref 3.4–5.3)
RBC # BLD AUTO: 4.48 10E6/UL (ref 3.8–5.2)
RSV RNA SPEC NAA+PROBE: NEGATIVE
SARS-COV-2 RNA RESP QL NAA+PROBE: NEGATIVE
SODIUM SERPL-SCNC: 137 MMOL/L (ref 135–145)
TROPONIN T SERPL HS-MCNC: <6 NG/L
WBC # BLD AUTO: 11.4 10E3/UL (ref 4–11)

## 2023-12-28 PROCEDURE — 71046 X-RAY EXAM CHEST 2 VIEWS: CPT

## 2023-12-28 PROCEDURE — 87637 SARSCOV2&INF A&B&RSV AMP PRB: CPT | Performed by: PHYSICIAN ASSISTANT

## 2023-12-28 PROCEDURE — 84703 CHORIONIC GONADOTROPIN ASSAY: CPT | Performed by: PHYSICIAN ASSISTANT

## 2023-12-28 PROCEDURE — 80048 BASIC METABOLIC PNL TOTAL CA: CPT | Performed by: PHYSICIAN ASSISTANT

## 2023-12-28 PROCEDURE — 93010 ELECTROCARDIOGRAM REPORT: CPT | Performed by: INTERNAL MEDICINE

## 2023-12-28 PROCEDURE — 99285 EMERGENCY DEPT VISIT HI MDM: CPT | Mod: 25 | Performed by: PHYSICIAN ASSISTANT

## 2023-12-28 PROCEDURE — 85025 COMPLETE CBC W/AUTO DIFF WBC: CPT | Performed by: PHYSICIAN ASSISTANT

## 2023-12-28 PROCEDURE — 99283 EMERGENCY DEPT VISIT LOW MDM: CPT | Performed by: PHYSICIAN ASSISTANT

## 2023-12-28 PROCEDURE — 36415 COLL VENOUS BLD VENIPUNCTURE: CPT | Performed by: PHYSICIAN ASSISTANT

## 2023-12-28 PROCEDURE — 83880 ASSAY OF NATRIURETIC PEPTIDE: CPT | Performed by: PHYSICIAN ASSISTANT

## 2023-12-28 PROCEDURE — 93005 ELECTROCARDIOGRAM TRACING: CPT | Performed by: PHYSICIAN ASSISTANT

## 2023-12-28 PROCEDURE — 85379 FIBRIN DEGRADATION QUANT: CPT | Performed by: PHYSICIAN ASSISTANT

## 2023-12-28 PROCEDURE — C9803 HOPD COVID-19 SPEC COLLECT: HCPCS | Performed by: PHYSICIAN ASSISTANT

## 2023-12-28 PROCEDURE — 84484 ASSAY OF TROPONIN QUANT: CPT | Performed by: PHYSICIAN ASSISTANT

## 2023-12-28 RX ORDER — PREGABALIN 25 MG/1
50 CAPSULE ORAL 2 TIMES DAILY
COMMUNITY
Start: 2023-12-18 | End: 2024-03-15

## 2023-12-28 ASSESSMENT — ENCOUNTER SYMPTOMS
HEMATURIA: 0
ABDOMINAL PAIN: 0
FEVER: 0
COUGH: 0
CHILLS: 0
SHORTNESS OF BREATH: 1

## 2023-12-28 ASSESSMENT — ACTIVITIES OF DAILY LIVING (ADL): ADLS_ACUITY_SCORE: 35

## 2023-12-28 NOTE — ED TRIAGE NOTES
Pt here by herself with c/o increased SOB and inability to take deep breaths, symptoms have been off and for the past couple of weeks, VSS, pt out into waiting room to wait for ED room   Triage Assessment (Adult)       Row Name 12/28/23 1335          Triage Assessment    Airway WDL WDL        Respiratory WDL    Respiratory WDL WDL        Skin Circulation/Temperature WDL    Skin Circulation/Temperature WDL WDL        Cardiac WDL    Cardiac WDL WDL        Peripheral/Neurovascular WDL    Peripheral Neurovascular WDL WDL        Cognitive/Neuro/Behavioral WDL    Cognitive/Neuro/Behavioral WDL WDL

## 2023-12-29 LAB
ATRIAL RATE - MUSE: 73 BPM
DIASTOLIC BLOOD PRESSURE - MUSE: NORMAL MMHG
INTERPRETATION ECG - MUSE: NORMAL
P AXIS - MUSE: 57 DEGREES
PR INTERVAL - MUSE: 112 MS
QRS DURATION - MUSE: 124 MS
QT - MUSE: 406 MS
QTC - MUSE: 447 MS
R AXIS - MUSE: 73 DEGREES
SYSTOLIC BLOOD PRESSURE - MUSE: NORMAL MMHG
T AXIS - MUSE: 69 DEGREES
VENTRICULAR RATE- MUSE: 73 BPM

## 2023-12-29 NOTE — ED NOTES
Writer took pt for a walk in the [place], O2 maintained above 98% throughout the duration of the walk.

## 2023-12-29 NOTE — ED PROVIDER NOTES
History     Chief Complaint   Patient presents with    Shortness of Breath     HPI  Katrina Hillman is a 18 year old female who presents ED for evaluation of shortness of breath. Pt here by herself with c/o increased SOB and inability to take deep breaths, symptoms have been off and for the past couple of weeks.  She has a history of Pamella-Parkinson-White.  She denies any history of DVT/PE, fevers, cough, chest pain.  She reports that from time to time when standing up she gets little lightheaded as well as some tingliness in both feet.  She has had similar episodes like this in the past for many years.    Allergies:  No Known Allergies    Problem List:    Patient Active Problem List    Diagnosis Date Noted    Arm numbness 10/13/2022     Priority: Medium    Pamella-Parkinson-White pattern 10/13/2022     Priority: Medium    Nicotine use disorder 07/20/2018     Priority: Medium    Tetrahydrocannabinol (THC) use disorder, mild, abuse 07/20/2018     Priority: Medium    Constipation 02/23/2018     Priority: Medium    Intentional drug overdose (H) 10/11/2017     Priority: Medium    Deliberate self-cutting 02/27/2017     Priority: Medium    Tic disorder 07/11/2012     Priority: Medium        Past Medical History:    Past Medical History:   Diagnosis Date    Abdominal pain     Anxiety     Carotid bruit     Constipation     Dyspnea     Gastroesophageal reflux disease with esophagitis     High risk heterosexual behavior 04/14/2017    History of Pamella-Parkinson-White (WPW) syndrome     Major depressive disorder, single episode     Nicotine use disorder 07/20/2018    Palpitations     Pneumonia     Tetrahydrocannabinol (THC) use disorder, mild, abuse 07/20/2018    Tic disorder        Past Surgical History:    Past Surgical History:   Procedure Laterality Date    DILATION AND CURETTAGE SUCTION  02/08/2022    [facility]    DILATION AND CURETTAGE SUCTION N/A 2/9/2022    Procedure: DILATION AND CURETTAGE, UTERUS, USING SUCTION;  Surgeon:  Jen Cisneros MD;  Location: [facility] OR    ESOPHAGOSCOPY, GASTROSCOPY, DUODENOSCOPY (EGD), COMBINED N/A 2018    Procedure: COMBINED ESOPHAGOSCOPY, GASTROSCOPY, DUODENOSCOPY (EGD), BIOPSY SINGLE OR MULTIPLE;  Gastroscopy;  Surgeon: Sreekanth Shaw MD;  Location: [facility] OR    HERNIA REPAIR       05,,HERNIA REPAIR,Repair of an epigastric and umbilical       Family History:    Family History   Problem Relation Age of Onset    Cancer Mother         Cancer,Hodgkin's lymphoma as teenager.    Other - See Comments Father         GI Disease,Ulcer, hernia    Bleeding Disorder Sister         Bloody noses    Heart Defect Maternal Grandfather     Other - See Comments Paternal Uncle         tics in childhood    Anesthesia Reaction No family hx of        Social History:  Marital Status:  Single [1]  Social History     Tobacco Use    Smoking status: Former     Packs/day: 0.50     Years: 5.00     Additional pack years: 0.00     Total pack years: 2.50     Types: Cigarettes     Quit date: 2022     Years since quittin.9     Passive exposure: Never    Smokeless tobacco: Never   Vaping Use    Vaping Use: Former    Substances: Nicotine, Flavoring    Devices: Disposable, Refillable tank   Substance Use Topics    Alcohol use: Not Currently     Comment: sometimes    Drug use: Not Currently     Types: Marijuana, Other, Methamphetamines     Comment: sober 8 months from pill and meth, patient currently uses Marijuana        Medications:    pregabalin (LYRICA) 25 MG capsule  busPIRone (BUSPAR) 5 MG tablet  Cholecalciferol (VITAMIN D3) 50 MCG ( UT) TABS  clindamycin-benzoyl peroxide (BENZACLIN) 1-5 % external gel  gabapentin (NEURONTIN) 100 MG capsule  lamoTRIgine (LAMICTAL) 150 MG tablet  meclizine (ANTIVERT) 25 MG tablet  Multiple Vitamins-Minerals (ONE-A-DAY WOMENS PO)  ondansetron (ZOFRAN ODT) 4 MG ODT tab  tretinoin (RETIN-A) 0.1 % external cream          Review of Systems   Constitutional:  Negative for chills and  "fever.   HENT:  Negative for congestion.    Eyes:  Negative for visual disturbance.   Respiratory:  Positive for shortness of breath. Negative for cough.    Cardiovascular:  Negative for chest pain.   Gastrointestinal:  Negative for abdominal pain.   Genitourinary:  Negative for hematuria.   Allergic/Immunologic: Negative for immunocompromised state.   Neurological:  Negative for syncope.       Physical Exam   BP: 117/75  Pulse: 89  Temp: 98.2  F (36.8  C)  Resp: 16  Height: 175.3 cm (5' 9\")  Weight: 84.4 kg (186 lb)  SpO2: 98 %      Physical Exam  Constitutional:       General: She is not in acute distress.     Appearance: She is well-developed. She is not diaphoretic.   HENT:      Head: Normocephalic and atraumatic.   Eyes:      General: No scleral icterus.     Conjunctiva/sclera: Conjunctivae normal.   Cardiovascular:      Rate and Rhythm: Normal rate and regular rhythm.   Pulmonary:      Effort: Pulmonary effort is normal.      Breath sounds: Normal breath sounds. No decreased breath sounds or wheezing.   Abdominal:      Palpations: Abdomen is soft.      Tenderness: There is no abdominal tenderness.   Musculoskeletal:         General: No deformity.      Cervical back: Normal range of motion and neck supple.   Lymphadenopathy:      Cervical: No cervical adenopathy.   Skin:     General: Skin is warm and dry.      Coloration: Skin is not jaundiced.      Findings: No rash.   Neurological:      General: No focal deficit present.      Mental Status: She is alert and oriented to person, place, and time. Mental status is at baseline.   Psychiatric:         Mood and Affect: Mood normal. Mood is not anxious.         Behavior: Behavior normal. Behavior is not agitated.         ED Course                 Procedures         EKG read at 1815.  Heart rate 73, normal sinus rhythm, consistent with Pamella-Parkinson-White, similar to previous EKGs.    Critical Care time:  none               Results for orders placed or performed " during the hospital encounter of 12/28/23 (from the past 24 hour(s))   CBC with platelets differential    Narrative    The following orders were created for panel order CBC with platelets differential.  Procedure                               Abnormality         Status                     ---------                               -----------         ------                     CBC with platelets and d...[[id number]]  Abnormal            Final result                 Please view results for these tests on the individual orders.   D dimer quantitative   Result Value Ref Range    D-Dimer Quantitative <=0.27 0.00 - 0.50 ug/mL FEU    Narrative    This D-dimer assay is intended for use in conjunction with a clinical pretest probability assessment model to exclude pulmonary embolism (PE) and deep venous thrombosis (DVT) in outpatients suspected of PE or DVT. The cut-off value is 0.50 ug/mL FEU.   Basic metabolic panel   Result Value Ref Range    Sodium 137 135 - 145 mmol/L    Potassium 3.8 3.4 - 5.3 mmol/L    Chloride 102 98 - 107 mmol/L    Carbon Dioxide (CO2) 25 22 - 29 mmol/L    Anion Gap 10 7 - 15 mmol/L    Urea Nitrogen 14.2 6.0 - 20.0 mg/dL    Creatinine 0.80 0.51 - 0.95 mg/dL    GFR Estimate >90 >60 mL/min/1.73m2    Calcium 9.1 8.6 - 10.0 mg/dL    Glucose 115 (H) 70 - 99 mg/dL   Troponin T, High Sensitivity   Result Value Ref Range    Troponin T, High Sensitivity <6 <=14 ng/L   Nt probnp inpatient (BNP)   Result Value Ref Range    N terminal Pro BNP Inpatient 178 0 - 450 pg/mL   HCG qualitative   Result Value Ref Range    hCG Serum Qualitative Negative Negative   CBC with platelets and differential   Result Value Ref Range    WBC Count 11.4 (H) 4.0 - 11.0 10e3/uL    RBC Count 4.48 3.80 - 5.20 10e6/uL    Hemoglobin 12.9 11.7 - 15.7 g/dL    Hematocrit 38.5 35.0 - 47.0 %    MCV 86 78 - 100 fL    MCH 28.8 26.5 - 33.0 pg    MCHC 33.5 31.5 - 36.5 g/dL    RDW 13.3 10.0 - 15.0 %    Platelet Count 199 150 - 450 10e3/uL    %  Neutrophils 68 %    % Lymphocytes 26 %    % Monocytes 5 %    % Eosinophils 1 %    % Basophils 0 %    % Immature Granulocytes 0 %    NRBCs per 100 WBC 0 <1 /100    Absolute Neutrophils 7.7 1.6 - 8.3 10e3/uL    Absolute Lymphocytes 2.9 0.8 - 5.3 10e3/uL    Absolute Monocytes 0.6 0.0 - 1.3 10e3/uL    Absolute Eosinophils 0.2 0.0 - 0.7 10e3/uL    Absolute Basophils 0.0 0.0 - 0.2 10e3/uL    Absolute Immature Granulocytes 0.0 <=0.4 10e3/uL    Absolute NRBCs 0.0 10e3/uL   Symptomatic Influenza A/B, RSV, & SARS-CoV2 PCR (COVID-19) Nose    Specimen: Nose; Swab   Result Value Ref Range    Influenza A PCR Negative Negative    Influenza B PCR Negative Negative    RSV PCR Negative Negative    SARS CoV2 PCR Negative Negative    Narrative    Testing was performed using the Xpert Xpress CoV2/Flu/RSV Assay on the [employer] GeneXpert Instrument. This test should be ordered for the detection of SARS-CoV-2, influenza, and RSV viruses in individuals who meet clinical and/or epidemiological criteria. Test performance is unknown in asymptomatic patients. This test is for in vitro diagnostic use under the FDA EUA for laboratories certified under CLIA to perform high or moderate complexity testing. This test has not been FDA cleared or approved. A negative result does not rule out the presence of PCR inhibitors in the specimen or target RNA in concentration below the limit of detection for the assay. If only one viral target is positive but coinfection with multiple targets is suspected, the sample should be re-tested with another FDA cleared, approved, or authorized test, if coinfection would change clinical management. This test was validated by the [facility]. These laboratories are certified under the Clinical Laboratory Improvement Amendments of 1988 (CLIA-88) as qualified to perform high complexity laboratory testing.   XR Chest 2 Views    Narrative    Exam:  XR CHEST 2 VIEWS    HISTORY: cough, sob.    COMPARISON:   None.    FINDINGS:     The cardiomediastinal contours are normal.      No focal consolidation, effusion, or pneumothorax.      No acute osseous abnormality.       Impression    IMPRESSION:      No acute cardiopulmonary process.      CATHERINE NICOLE MD         SYSTEM ID:  [id number]       Medications - No data to display    Assessments & Plan (with Medical Decision Making)   Pt nontoxic in NAD. Heart, lung, bowel sounds normal, abd soft, nontender to palpation, nondistended. VSS, afebrile.     EKG show or Parkinson White, consistent with previous EKGs.    Her findings appeared very well with WBC 11.4, normal hemoglobin, negative troponin and D-dimer.  Negative viral panel.  BNP is 178.  Chest x-ray showed no acute cardiopulmonary process.  Overall she seems to be doing very well.  She is road tested and does well.  However we did do orthostatics and upon standing blood pressure good did go down to limit.  I did recommend starting IV given her fluids however she declined at this time.  I also discussed options with her including further observation given her history of Parkinson White, however, her complaints at this time were not consistent with necessarily a Pamella-Parkinson-White cause arrhythmia.  Overall, is not clear what is causing her increased feeling of shortness of breath but she has very reassuring findings.  I will place referral for her to follow-up with cardiology.    Strict return precautions are given to the pt, they will return if symptoms are worsening or concerning. The pt understands and agrees with the plan and they are discharged.     Darek Rivera PA-C    I have reviewed the nursing notes.    I have reviewed the findings, diagnosis, plan and need for follow up with the patient.        Discharge Medication List as of 12/28/2023  7:56 PM          Final diagnoses:   SOB (shortness of breath)   Pamella-Parkinson-White syndrome       12/28/2023   [facility],  JORGE A Oswald  12/28/23 4224

## 2023-12-29 NOTE — DISCHARGE INSTRUCTIONS
Get plenty of fluids and rest.  As discussed, all of your lab work, imaging appears very well.  You continue to have a Pamella-Parkinson-White syndrome.  We discussed further observation and fluids however decided to continue as an outpatient today.  Referral was placed for you to follow-up with cardiology in the clinic.  Return if there are worsening or concerning symptoms.

## 2024-01-02 ENCOUNTER — LAB REQUISITION (OUTPATIENT)
Dept: LAB | Facility: OTHER | Age: 19
End: 2024-01-02

## 2024-01-02 DIAGNOSIS — Z11.52 ENCOUNTER FOR SCREENING FOR COVID-19: ICD-10-CM

## 2024-01-02 LAB — SARS-COV-2 RNA RESP QL NAA+PROBE: NEGATIVE

## 2024-01-02 PROCEDURE — 87635 SARS-COV-2 COVID-19 AMP PRB: CPT | Performed by: FAMILY MEDICINE

## 2024-01-11 NOTE — PROGRESS NOTES
"Service Date: 2024    Joelle Faith PA  [address] [place] [address]     RE:  Katrina Hillman   MRN:  [id number]   :  2005       Dear Ms. Richardsle:    It was a pleasure participating in the care of your patient, Katrina Hillman .  As you know, she is a 18 year old year old person who I met over a virtual visit today for palpitations with an EKG demonstrating accessory pathway, blood pressure control, tobacco abuse.    Past medical history is significant for the followin.  Depression with history of intentional drug overdose and self cutting  2.  THC abuse  3.  Tobacco abuse  4.  Hernia repair    The patient was seen by Dr. Shore and electrophysiology on 2022.  At that time Zio patch monitor  through 2019 revealed that her symptoms of \"fluttering corresponded to sinus tachycardia at a rate of 151 bpm without significant preexcitation.    Dr. Shore suggested that the preexcitation was only visible at heart rates less than 60, suggesting weak antegrade pathway with decreased risk of malignant arrhythmias.  She suggested clinical monitoring at that time.    From a clinical standpoint, since seeing Dr. Shore in , she has done relatively well.  She presents today with some new symptoms.    Recently she has stopped smoking over the past year, and now complains that she can no longer feel her lungs completely with air at random times.  It is not necessarily related to exertion and she can feel it by having to take a deep breath while at rest.  She denies any significant recent weight gain and has been steady around the 180 to 185 pound range.  She denies any new edema.    Additionally she feels an occasional skipping of her heart or feels her heart \"pounding hard\" but not necessarily fast without dizziness lightheadedness syncope or near syncope as well.    She likes to walk and longboard, however she feels a bit of trepidation prior to starting an exercise program before " "she can have her heart \"checked out\".    The patient otherwise denies gross chest pain, PND, orthopnea, edema, palpitations, syncope or near syncope.    Social history: She works as a CNA, and enjoys going for walks and long boarding    MEDICATIONS:    1.  BuSpar  2.  Gabapentin  3.  Lamotrigine  4.  Pregabalin    PHYSICAL EXAM:    9/21/2023: Blood pressure 110/66, pulse 74, weight 186 pounds  General:  Patient appears comfortable, well groomed  Psych:  Patient is alert and oriented X 3  Eyes:  No gross erythema, exudate  Respiratory:  Patient is breathing comfortably without gross cough    The remainder of the comprehensive physical exam was deferred secondary to the COVID-19 pandemic and secondary to virtual visit restrictions.    LABS:    12/28/2023: Potassium 3.8, GFR normal, hemoglobin normal  9/21/2023: TSH normal    Carotid ultrasound 2/8/2022 reveals possible moderate stenosis of the right carotid, left normal, no plaque identified    CTA head neck 8/17/2022 reveals no significant stenoses of the carotids or vertebral arteries    EKG 12/28/2023 reveals normal sinus rhythm with significant preexcitation present and delta waves with RSR prime noted in V1 V2    Echocardiogram 2/8/2022 reveals an ejection fraction of 55 to 60% no significant valvular pathology identified, positive PFO by color Doppler only    IMPRESSION:    Katrina is a 18-year-old lady whose past medical history significant for depression and self-harm, THC abuse and tobacco abuse who presents with several active issues:    1.  Palpitations with EKG positive for accessory pathway    Historically, she is felt recent palpitations manifested by her heart \"skipping and/or pounding hard\".  She is quite concerned about these palpitations considering her history of WPW, and requests further noninvasive evaluation.    2.  Blood pressure control    Her baseline blood pressures tend to run low, and in fact she experiences occasional orthostasis when her " "fluid intake is not adequate.  We did review that in the long run she may require active tracking of her volume intake in order to avoid orthostasis in the future particularly in hot weather or at times when dietary intake is not regular.      PLAN:    1.  Echocardiogram to rule out significant new structural pathology as cause for symptoms    2.  14-day Zio patch monitor in order to rule out significant arrhythmias as a cause for symptoms    3.  Pulmonary function tests to evaluate current lung function    4.  Further updates to follow pending above test results    Once again, it was a pleasure participating in the care of your patient, Katrina Hillman .  Please feel free to contact me at any time if there are any questions regarding her care in the future.      Sincerely,          Orville Sanders M.D.  Cardiovascular Division  [facility]      Addendum 2/1/24:    Echo reveals normal LV systolic function without gross valvular pathology  ASA with small PFO incidentally noted    Await remainder of test results      Addendum 2/5/24:    PFTs reveal FEV1 4.17, FVC 5.01 representing 106 and 111% predicted  DLCO 98%    Await monitor results      The patient has been notified of following:     \"This video visit will be conducted via a call between you and your physician/provider. We have found that certain health care needs can be provided without the need for an in-person physical exam.  This service lets us provide the care you need with a video conversation.  If a prescription is necessary we can send it directly to your pharmacy.  If lab work is needed we can place an order for that and you can then stop by our lab to have the test done at a later time.    Video visits are billed at different rates depending on your insurance coverage.  Please reach out to your insurance provider with any questions.    If during the course of the call the physician/provider feels a video visit is not appropriate, you will not " "be charged for this service.\"    Patient has given verbal consent for video visit? Yes    How would you like to obtain your AVS? Mail    Video-Visit Details    Type of service:  Video Visit    Video Start Time:933am    Video End Time:955am    Total visit time including video visit, chart review, charting, coordination of care =56min    Originating Location (pt. Location):patient home      Distant Location (provider location):   Off site home office    Platform used for Video Visit: Rosales ROBLERO#:[id number]         "

## 2024-01-17 ENCOUNTER — HOSPITAL ENCOUNTER (OUTPATIENT)
Dept: GENERAL RADIOLOGY | Facility: OTHER | Age: 19
Discharge: HOME | End: 2024-01-17
Attending: FAMILY MEDICINE
Payer: COMMERCIAL

## 2024-01-17 ENCOUNTER — OFFICE VISIT (OUTPATIENT)
Dept: FAMILY MEDICINE | Facility: OTHER | Age: 19
End: 2024-01-17
Attending: FAMILY MEDICINE
Payer: COMMERCIAL

## 2024-01-17 VITALS
WEIGHT: 192.2 LBS | OXYGEN SATURATION: 97 % | DIASTOLIC BLOOD PRESSURE: 46 MMHG | TEMPERATURE: 97.6 F | HEART RATE: 75 BPM | RESPIRATION RATE: 20 BRPM | BODY MASS INDEX: 28.38 KG/M2 | SYSTOLIC BLOOD PRESSURE: 88 MMHG

## 2024-01-17 DIAGNOSIS — M25.551 BILATERAL HIP PAIN: ICD-10-CM

## 2024-01-17 DIAGNOSIS — M25.551 BILATERAL HIP PAIN: Primary | ICD-10-CM

## 2024-01-17 DIAGNOSIS — M25.552 BILATERAL HIP PAIN: Primary | ICD-10-CM

## 2024-01-17 DIAGNOSIS — M25.552 BILATERAL HIP PAIN: ICD-10-CM

## 2024-01-17 PROCEDURE — 99213 OFFICE O/P EST LOW 20 MIN: CPT | Performed by: FAMILY MEDICINE

## 2024-01-17 PROCEDURE — 73522 X-RAY EXAM HIPS BI 3-4 VIEWS: CPT

## 2024-01-17 RX ORDER — ETODOLAC 500 MG
500 TABLET ORAL 2 TIMES DAILY
Qty: 30 TABLET | Refills: 3 | Status: SHIPPED | OUTPATIENT
Start: 2024-01-17 | End: 2024-03-15

## 2024-01-17 RX ORDER — OLANZAPINE 5 MG/1
5 TABLET ORAL AT BEDTIME
COMMUNITY
Start: 2023-12-14

## 2024-01-17 ASSESSMENT — ANXIETY QUESTIONNAIRES
8. IF YOU CHECKED OFF ANY PROBLEMS, HOW DIFFICULT HAVE THESE MADE IT FOR YOU TO DO YOUR WORK, TAKE CARE OF THINGS AT HOME, OR GET ALONG WITH OTHER PEOPLE?: EXTREMELY DIFFICULT
7. FEELING AFRAID AS IF SOMETHING AWFUL MIGHT HAPPEN: MORE THAN HALF THE DAYS
IF YOU CHECKED OFF ANY PROBLEMS ON THIS QUESTIONNAIRE, HOW DIFFICULT HAVE THESE PROBLEMS MADE IT FOR YOU TO DO YOUR WORK, TAKE CARE OF THINGS AT HOME, OR GET ALONG WITH OTHER PEOPLE: EXTREMELY DIFFICULT
1. FEELING NERVOUS, ANXIOUS, OR ON EDGE: NEARLY EVERY DAY
GAD7 TOTAL SCORE: 20
4. TROUBLE RELAXING: NEARLY EVERY DAY
GAD7 TOTAL SCORE: 20
2. NOT BEING ABLE TO STOP OR CONTROL WORRYING: NEARLY EVERY DAY
3. WORRYING TOO MUCH ABOUT DIFFERENT THINGS: NEARLY EVERY DAY
6. BECOMING EASILY ANNOYED OR IRRITABLE: NEARLY EVERY DAY
5. BEING SO RESTLESS THAT IT IS HARD TO SIT STILL: NEARLY EVERY DAY
GAD7 TOTAL SCORE: 20
7. FEELING AFRAID AS IF SOMETHING AWFUL MIGHT HAPPEN: MORE THAN HALF THE DAYS

## 2024-01-17 ASSESSMENT — PAIN SCALES - GENERAL: PAINLEVEL: NO PAIN (0)

## 2024-01-17 ASSESSMENT — PATIENT HEALTH QUESTIONNAIRE - PHQ9
SUM OF ALL RESPONSES TO PHQ QUESTIONS 1-9: 24
SUM OF ALL RESPONSES TO PHQ QUESTIONS 1-9: 24
10. IF YOU CHECKED OFF ANY PROBLEMS, HOW DIFFICULT HAVE THESE PROBLEMS MADE IT FOR YOU TO DO YOUR WORK, TAKE CARE OF THINGS AT HOME, OR GET ALONG WITH OTHER PEOPLE: EXTREMELY DIFFICULT

## 2024-01-17 NOTE — COMMUNITY RESOURCES LIST (ENGLISH)
01/17/2024   [facility]  N/A  For questions about this resource list or additional care needs, please contact your primary care clinic or care manager.  Phone: [phone]   Email: N/A   Address: [address] [place] [id number]   Hours: N/A        Financial Stability       Utility payment assistance  1  [employer] [facility] Office - Energy Assistance Program Distance: 4.61 miles      Phone/Virtual   [address] [place] [address]  Language: English  Hours: Mon - Thu 8:00 AM - 4:30 PM , Fri 8:00 AM - 12:00 PM  Fees: Free   Phone: (128) 113-3832 Email: cheryl@[employer] Website: http://www.[employer]          Food and Nutrition       Food pantry  2  [facility] [employer] Advisory Network [facility] Distance: 4.46 miles      In-Person   [address] [place] [address]  Language: English  Hours: Mon - Thu 11:00 AM - 3:30 PM  Fees: Free   Phone: (640) 633-6134 Email: info@rankur Website: https://rankur     3  [facility] Food Shelf Distance: 16.65 miles      [facility]   [address] [place] [address]  Language: English  Hours: Thu 10:00 AM - 1:00 PM  Fees: Free   Phone: (784) 298-9299 Email: brian@[employer] Website: https://www.[employer].com/Deer[place]iverAreaFoodShelf/     SNAP application assistance  4  [employer] [facility] Distance: 3.47 miles      In-Person, Phone/Virtual   [address] [place] [address]  Language: English  Hours: Mon - Fri 8:00 AM - 4:30 PM  Fees: Free   Phone: (385) 938-6499 Website: https://www.[employer]urious/partner/lldixxkpq-ougestgo-yrdvccczbwu-agency-[facility]     5  [facility] Distance: 3.48 miles      [address] [place] [address] [place] [address]  Language: English  Hours: Mon - Fri 8:00 AM - 4:30 PM  Fees: Free   Phone: (570) 604-8905 Website:  https://www.co.[place]a.[place].us/232/Health-Human-Services          Important Numbers & Websites       Emergency Services   911  [facility] Services   311  Poison Control   (215) 284-6140  Suicide Prevention Lifeline   (555) 931-1068 (TALK)  Child Abuse Hotline   (341) 543-1039 (4-A-Child)  Sexual Assault Hotline   (271) 789-6070 (HOPE)  National Runaway Safeline   (233) 267-5533 (RUNAWAY)  All-Options Talkline   (594) 166-2471  Substance Abuse Referral   (522) 203-3244 (HELP)

## 2024-01-17 NOTE — NURSING NOTE
Patient here for SOB that has been getting worse. She does have a cardiology appt tomorrow 01/18/2024 with Orville Brady. She is also having bilateral hip pain. Medication Reconciliation: complete.    Romelia Fuentes LPN  1/17/2024 8:43 AM

## 2024-01-18 ENCOUNTER — VIRTUAL VISIT (OUTPATIENT)
Dept: CARDIOLOGY | Facility: OTHER | Age: 19
End: 2024-01-18
Attending: INTERNAL MEDICINE
Payer: COMMERCIAL

## 2024-01-18 VITALS
SYSTOLIC BLOOD PRESSURE: 88 MMHG | DIASTOLIC BLOOD PRESSURE: 46 MMHG | BODY MASS INDEX: 26.66 KG/M2 | WEIGHT: 180 LBS | HEIGHT: 69 IN

## 2024-01-18 DIAGNOSIS — R06.02 SOB (SHORTNESS OF BREATH): Primary | ICD-10-CM

## 2024-01-18 DIAGNOSIS — R00.2 PALPITATIONS: ICD-10-CM

## 2024-01-18 PROCEDURE — 99215 OFFICE O/P EST HI 40 MIN: CPT | Mod: 95 | Performed by: INTERNAL MEDICINE

## 2024-01-18 PROCEDURE — 99417 PROLNG OP E/M EACH 15 MIN: CPT | Mod: 95 | Performed by: INTERNAL MEDICINE

## 2024-01-18 ASSESSMENT — PAIN SCALES - GENERAL: PAINLEVEL: NO PAIN (0)

## 2024-01-18 NOTE — PROGRESS NOTES
SUBJECTIVE:   Katrina Hillman is a 18 year old female who presents to clinic today for the following health issues: Hip pain    Patient arrives here for hip pain.  She also has chronic shortness of breath but is being seen by cardiology tomorrow.  She has had hip pain in the past.  Is tender on both sides.  Left is worse than right.  Feels like she might be locking up.  It is constantly sore.  Occasionally the left one gives out.  She has had problems with this for years on and off but seems to be getting worse.  She has had to stop running cross-country.  Pain at times does extend into the groin but is more lateral.  In the past she has been told it is tendinitis    History of Present Illness       Reason for visit:  Hip issues    She eats 0-1 servings of fruits and vegetables daily.She consumes 1 sweetened beverage(s) daily.She exercises with enough effort to increase her heart rate 9 or less minutes per day.  She exercises with enough effort to increase her heart rate 3 or less days per week. She is missing 2 dose(s) of medications per week.  She is not taking prescribed medications regularly due to remembering to take.        Patient Active Problem List    Diagnosis Date Noted    Arm numbness 10/13/2022     Priority: Medium    Pamella-Parkinson-White pattern 10/13/2022     Priority: Medium    Nicotine use disorder 07/20/2018     Priority: Medium    Tetrahydrocannabinol (THC) use disorder, mild, abuse 07/20/2018     Priority: Medium    Constipation 02/23/2018     Priority: Medium    Intentional drug overdose (H) 10/11/2017     Priority: Medium    Deliberate self-cutting 02/27/2017     Priority: Medium    Tic disorder 07/11/2012     Priority: Medium     Past Medical History:   Diagnosis Date    Abdominal pain     04/24/08,x6 months thought to be secondary to GERD (upper GI with small bowel followthrough scheduled)    Anxiety     Carotid bruit     Constipation     No Comments Provided    Dyspnea     Gastroesophageal  reflux disease with esophagitis     High risk heterosexual behavior 04/14/2017    G1 2/22    History of Pamella-Parkinson-White (WPW) syndrome     seen on 2019 [place]    Major depressive disorder, single episode     1/15/2016    Nicotine use disorder 07/20/2018    Palpitations     Pneumonia     2003,1 day hospitalization    Tetrahydrocannabinol (THC) use disorder, mild, abuse 07/20/2018    Tic disorder     07/2012,both motor and verbal        Review of Systems     OBJECTIVE:     BP (!) 88/46   Pulse 75   Temp 97.6  F (36.4  C)   Resp 20   Wt 87.2 kg (192 lb 3.2 oz)   LMP 01/08/2024   SpO2 97%   BMI 28.38 kg/m    Body mass index is 28.38 kg/m .  Physical Exam  Constitutional:       Appearance: Normal appearance.   Musculoskeletal:         General: Normal range of motion.      Comments: She has good range of motion to both hips internal/external rotation.  Flexion is good.  No pain associate with palpation or movement   Neurological:      Mental Status: She is alert.         Diagnostic Test Results:  No results found for this or any previous visit (from the past 24 hour(s)).    ASSESSMENT/PLAN:         (M25.551,  M25.552) Bilateral hip pain  (primary encounter diagnosis)  Comment: X-rays were unremarkable.  Plan: XR Pelvis and Hip Bilateral 2 Views, etodolac         (LODINE) 500 MG tablet      Trial of Lodine.  If no improvement would consider physical therapy.  Cardiology follow-up      Jamaal Monsivais MD  [facility]

## 2024-01-18 NOTE — PATIENT INSTRUCTIONS
1.  Echocardiogram to rule out significant new structural pathology as cause for symptoms    2.  14-day Zio patch monitor in order to rule out significant arrhythmias as a cause for symptoms    3.  Pulmonary function tests to evaluate current lung function    4.  Further updates to follow pending above test results

## 2024-01-18 NOTE — NURSING NOTE
Patient denies any changes since check-in regarding medication and allergies and states all information entered during check-in remains accurate.    Is the patient currently in the state of [place]? YES    Visit mode:VIDEO    If the visit is dropped, the patient can be reconnected by: VIDEO VISIT: Text to cell phone:   Telephone Information:   Mobile [phone]       Will anyone else be joining the visit? NO  (If patient encounters technical issues they should call [phone]    How would you like to obtain your AVS? MyChart    Are changes needed to the allergy or medication list? No, Pt stated no changes to allergies, and Pt stated no med changes    Reason for visit: Consult (Consult to discuss SOB, no initial questions/concerns per pt. Medications/allergies reviewed with pt, no changes per pt. Pt declined PHQ-2 today.)    Annemarie Grullon, Visit Facilitator/MA.

## 2024-01-31 ENCOUNTER — HOSPITAL ENCOUNTER (OUTPATIENT)
Dept: CARDIOLOGY | Facility: OTHER | Age: 19
Discharge: HOME | End: 2024-01-31
Attending: INTERNAL MEDICINE
Payer: COMMERCIAL

## 2024-01-31 ENCOUNTER — ALLIED HEALTH/NURSE VISIT (OUTPATIENT)
Dept: FAMILY MEDICINE | Facility: OTHER | Age: 19
End: 2024-01-31
Attending: INTERNAL MEDICINE
Payer: COMMERCIAL

## 2024-01-31 ENCOUNTER — HOSPITAL ENCOUNTER (OUTPATIENT)
Dept: RESPIRATORY THERAPY | Facility: OTHER | Age: 19
Discharge: HOME | End: 2024-01-31
Payer: COMMERCIAL

## 2024-01-31 DIAGNOSIS — R00.2 PALPITATIONS: ICD-10-CM

## 2024-01-31 DIAGNOSIS — R06.02 SOB (SHORTNESS OF BREATH): ICD-10-CM

## 2024-01-31 DIAGNOSIS — R00.2 PALPITATIONS: Primary | ICD-10-CM

## 2024-01-31 PROCEDURE — 94726 PLETHYSMOGRAPHY LUNG VOLUMES: CPT | Mod: 26 | Performed by: INTERNAL MEDICINE

## 2024-01-31 PROCEDURE — 93306 TTE W/DOPPLER COMPLETE: CPT

## 2024-01-31 PROCEDURE — 94729 DIFFUSING CAPACITY: CPT

## 2024-01-31 PROCEDURE — 94729 DIFFUSING CAPACITY: CPT | Mod: 26 | Performed by: INTERNAL MEDICINE

## 2024-01-31 PROCEDURE — 999N000157 HC STATISTIC RCP TIME EA 10 MIN

## 2024-01-31 PROCEDURE — 94010 BREATHING CAPACITY TEST: CPT

## 2024-01-31 PROCEDURE — 94010 BREATHING CAPACITY TEST: CPT | Mod: 26 | Performed by: INTERNAL MEDICINE

## 2024-01-31 PROCEDURE — 93246 EXT ECG>7D<15D RECORDING: CPT

## 2024-01-31 PROCEDURE — 94726 PLETHYSMOGRAPHY LUNG VOLUMES: CPT

## 2024-01-31 PROCEDURE — 93306 TTE W/DOPPLER COMPLETE: CPT | Mod: 26 | Performed by: INTERNAL MEDICINE

## 2024-01-31 NOTE — PROGRESS NOTES
Patient arrived (date)01/31/2024 (time)1420 for a 14 day Zio monitor per (provider name) [facility] for Palpitations (Dx).  Patient's skin was prepped per protocol.  Zio monitor was placed.  Patient verbalized understanding of instructions and plan of care.  Patient was given Zio diary and prepaid .  Respiratory Therapist reviewed Zio Patch placement process and asked patient if they are comfortable proceeding with placement.  Patient (is or is not) is comfortable proceeding.  Patient (would or would not) would not like an employee of same gender to be (present or to place) present or to place the Zio Patch.  Documentation of Zio Patch placement site (Bleeding or Not Bleeding) Not Bleeding.  If there is bleeding documentation of why.  N/A  Documentation of accommodations made for patient.  No    Eber Moss RT

## 2024-02-01 LAB — LVEF ECHO: NORMAL

## 2024-03-07 PROCEDURE — 93248 EXT ECG>7D<15D REV&INTERPJ: CPT | Performed by: INTERNAL MEDICINE

## 2024-03-15 ENCOUNTER — OFFICE VISIT (OUTPATIENT)
Dept: FAMILY MEDICINE | Facility: OTHER | Age: 19
End: 2024-03-15
Payer: COMMERCIAL

## 2024-03-15 VITALS
HEIGHT: 69 IN | TEMPERATURE: 98.9 F | RESPIRATION RATE: 12 BRPM | HEART RATE: 72 BPM | SYSTOLIC BLOOD PRESSURE: 104 MMHG | DIASTOLIC BLOOD PRESSURE: 64 MMHG | BODY MASS INDEX: 27.7 KG/M2 | WEIGHT: 187 LBS | OXYGEN SATURATION: 97 %

## 2024-03-15 DIAGNOSIS — J02.9 SORE THROAT: ICD-10-CM

## 2024-03-15 DIAGNOSIS — R05.1 ACUTE COUGH: ICD-10-CM

## 2024-03-15 DIAGNOSIS — J06.9 VIRAL URI WITH COUGH: Primary | ICD-10-CM

## 2024-03-15 LAB
FLUAV RNA SPEC QL NAA+PROBE: NEGATIVE
FLUBV RNA RESP QL NAA+PROBE: NEGATIVE
GROUP A STREP BY PCR: NOT DETECTED
RSV RNA SPEC NAA+PROBE: NEGATIVE
SARS-COV-2 RNA RESP QL NAA+PROBE: NEGATIVE

## 2024-03-15 PROCEDURE — 87637 SARSCOV2&INF A&B&RSV AMP PRB: CPT | Mod: ZL

## 2024-03-15 PROCEDURE — 99213 OFFICE O/P EST LOW 20 MIN: CPT

## 2024-03-15 PROCEDURE — 87651 STREP A DNA AMP PROBE: CPT | Mod: ZL

## 2024-03-15 ASSESSMENT — PAIN SCALES - GENERAL: PAINLEVEL: SEVERE PAIN (6)

## 2024-03-15 NOTE — PROGRESS NOTES
ASSESSMENT/PLAN:    I have reviewed the nursing notes.  I have reviewed the findings, diagnosis, plan and need for follow up with the patient.    1. Viral URI with cough  2. Sore throat  3. Acute cough  - Group A Streptococcus PCR Throat Swab  - Symptomatic Influenza A/B, RSV, & SARS-CoV2 PCR (COVID-19) Nose    Patient presents with upper respiratory symptoms.  Patient's vitals are stable and she appears nontoxic.  Strep and multiplex test were both negative. Discussed with patient that symptoms and exam are consistent with viral illness.  Discussed that symptomatic treatment of cough is appropriate but not with antibiotics.    Discussed symptomatic treatment - Encouraged fluids, salt water gargles, honey (only if greater than 1 year in age due to risk of botulism), elevation, humidifier, sinus rinse/netti pot, lozenges, tea, topical vapor rub, popsicles, rest, etc. May use over-the-counter Tylenol or ibuprofen PRN.    Discussed warning signs/symptoms indicative of need to f/u    Follow up if symptoms persist or worsen or concerns    I explained my diagnostic considerations and recommendations to the patient, who voiced understanding and agreement with the treatment plan. All questions were answered. We discussed potential side effects of any prescribed or recommended therapies, as well as expectations for response to treatments.    MAGGIE Biggs CNP  3/15/2024  2:44 PM    HPI:    Katrina Hillman is a 18 year old female  who presents to [facility] today for concerns of sore throat and ear pain    sore throat, x 3 days duration.    Yes Difficulty swallowing, breathing or handling own secretions.   No fevers or chills.   Yes allergy/URI Symptoms.   Yes Otalgia  Yes Muffled Sounds/Change in Hearing.   Yes Sensation of Fullness in Ear(s).   No Ringing in Ears/Tinnitus.   No Headache.   Yes Congestion (head/nasal/chest).   Yes Cough/Productive Cough.   Yes Post Nasal Drip   Yes Sinus Pain/Pressure.   No  Myalgias.   Yes nausea   No rash.     No change to bowel or bladder habits. Fatigue/energy level changes: No. Change to appetite/fluid intake: No    Any prior HEENT surgery for removal of tonsils or adenoids: Yes  Recent antibiotic use: No  Exposure to sick contacts including: strep, influenza, COVID, other bacterial or viral illnesses - unknown  Exposure site: unknown  Treatments tried: advil    Additional symptoms to report: none    PCP: JORGE A Allred      Past Medical History:   Diagnosis Date    Abdominal pain     04/24/08,x6 months thought to be secondary to GERD (upper GI with small bowel followthrough scheduled)    Anxiety     Carotid bruit     Constipation     No Comments Provided    Dyspnea     Gastroesophageal reflux disease with esophagitis     High risk heterosexual behavior 04/14/2017    G1 2/22    History of Pamella-Parkinson-White (WPW) syndrome     seen on 2019 zio    Major depressive disorder, single episode     1/15/2016    Nicotine use disorder 07/20/2018    Palpitations     Pneumonia     2003,1 day hospitalization    Tetrahydrocannabinol (THC) use disorder, mild, abuse 07/20/2018    Tic disorder     07/2012,both motor and verbal     Past Surgical History:   Procedure Laterality Date    DILATION AND CURETTAGE SUCTION  02/08/2022    [facility]    DILATION AND CURETTAGE SUCTION N/A 2/9/2022    Procedure: DILATION AND CURETTAGE, UTERUS, USING SUCTION;  Surgeon: Jen Cisneros MD;  Location: [facility]    ESOPHAGOSCOPY, GASTROSCOPY, DUODENOSCOPY (EGD), COMBINED N/A 06/08/2018    Procedure: COMBINED ESOPHAGOSCOPY, GASTROSCOPY, DUODENOSCOPY (EGD), BIOPSY SINGLE OR MULTIPLE;  Gastroscopy;  Surgeon: Sreekanth Shaw MD;  Location:  OR    HERNIA REPAIR       03/17/05,,HERNIA REPAIR,Repair of an epigastric and umbilical     Social History     Tobacco Use    Smoking status: Former     Packs/day: 0.50     Years: 5.00     Additional pack years: 0.00     Total pack years: 2.50     Types: Cigarettes      "Quit date: 2022     Years since quittin.2     Passive exposure: Never    Smokeless tobacco: Never   Substance Use Topics    Alcohol use: Not Currently     Comment: sometimes     Current Outpatient Medications   Medication Sig Dispense Refill    busPIRone (BUSPAR) 5 MG tablet 10 mg      Cholecalciferol (VITAMIN D3) 50 MCG ( UT) TABS Take 3 capsules by mouth every morning Take with food       clindamycin-benzoyl peroxide (BENZACLIN) 1-5 % external gel APPLY TOPICALLY TO FACE 1 TIME IN THE MORNING AFTER WASHING. FOLLOW WITH A MOISTURIZER AS NEEDED      tretinoin (RETIN-A) 0.1 % external cream       etodolac (LODINE) 500 MG tablet Take 1 tablet (500 mg) by mouth 2 times daily 30 tablet 3    Multiple Vitamins-Minerals (ONE-A-DAY WOMENS PO) Take by mouth every morning       OLANZapine (ZYPREXA) 5 MG tablet Take 5 mg by mouth at bedtime  TAKE 1-2 TABLETS BY MOUTH EVERY NIGHT AT BEDTIME AS NEEDED FOR AGITATION/ANXIETY/SHELLI/PSYCHOTIC SYMPTOMS (Patient not taking: Reported on 3/15/2024)      pregabalin (LYRICA) 25 MG capsule Take 50 mg by mouth 2 times daily       No Known Allergies  Past medical history, past surgical history, current medications and allergies reviewed and accurate to the best of my knowledge.      ROS:  Refer to HPI    /64 (BP Location: Left arm, Patient Position: Sitting, Cuff Size: Adult Regular)   Pulse 72   Temp 98.9  F (37.2  C) (Tympanic)   Resp 12   Ht 1.753 m (5' 9\")   Wt 84.8 kg (187 lb)   LMP 2024 (Approximate)   SpO2 97%   BMI 27.62 kg/m      EXAM:  General Appearance: Well appearing 18 year old female, appropriate appearance for age. No acute distress   Ears: Left TM intact, translucent with bony landmarks appreciated, no erythema, no effusion, no bulging, no purulence.  Right TM intact, translucent with bony landmarks appreciated, no erythema, no effusion, no bulging, no purulence.  Left auditory canal clear.  Right auditory canal clear.  Normal external ears, " non tender.  Eyes: conjunctivae normal without erythema or irritation, corneas clear, no drainage or crusting, no eyelid swelling, pupils equal   Oropharynx: moist mucous membranes, posterior pharynx without erythema, tonsils symmetric and 1+, no erythema, no exudates or petechiae, no post nasal drip seen, no trismus, voice clear.    Nose:  Bilateral nares: no erythema, no edema, clear drainage and congestion   Neck: supple without adenopathy  Respiratory: normal chest wall and respirations.  Normal effort.  Clear to auscultation bilaterally, no wheezing, crackles or rhonchi.  No increased work of breathing.  No cough appreciated.  Cardiac: RRR with no murmurs  Musculoskeletal:  Equal movement of bilateral upper extremities.  Equal movement of bilateral lower extremities.  Normal gait.    Dermatological: no rashes noted of exposed skin  Neuro: Alert and oriented to person, place, and time.    Psychological: normal affect, alert, oriented, and pleasant.     Labs:  Results for orders placed or performed in visit on 03/15/24   Symptomatic Influenza A/B, RSV, & SARS-CoV2 PCR (COVID-19) Nose     Status: Normal    Specimen: Nose; Swab   Result Value Ref Range    Influenza A PCR Negative Negative    Influenza B PCR Negative Negative    RSV PCR Negative Negative    SARS CoV2 PCR Negative Negative    Narrative    Testing was performed using the Xpert Xpress CoV2/Flu/RSV Assay on the [employer] GeneXpert Instrument. This test should be ordered for the detection of SARS-CoV-2, influenza, and RSV viruses in individuals who meet clinical and/or epidemiological criteria. Test performance is unknown in asymptomatic patients. This test is for in vitro diagnostic use under the FDA EUA for laboratories certified under CLIA to perform high or moderate complexity testing. This test has not been FDA cleared or approved. A negative result does not rule out the presence of PCR inhibitors in the specimen or target RNA in concentration below  the limit of detection for the assay. If only one viral target is positive but coinfection with multiple targets is suspected, the sample should be re-tested with another FDA cleared, approved, or authorized test, if coinfection would change clinical management. This test was validated by the [facility]. These laboratories are certified under the Clinical Laboratory Improvement Amendments of 1988 (CLIA-88) as qualified to perform high complexity laboratory testing.   Group A Streptococcus PCR Throat Swab     Status: Normal    Specimen: Throat; Swab   Result Value Ref Range    Group A strep by PCR Not Detected Not Detected    Narrative    The Xpert Xpress Strep A test, performed on the [employer], is a rapid, qualitative in vitro diagnostic test for the detection of Streptococcus pyogenes (Group A ß-hemolytic Streptococcus, Strep A) in throat swab specimens from patients with signs and symptoms of pharyngitis. The Xpert Xpress Strep A test can be used as an aid in the diagnosis of Group A Streptococcal pharyngitis. The assay is not intended to monitor treatment for Group A Streptococcus infections. The Xpert Xpress Strep A test utilizes an automated real-time polymerase chain reaction (PCR) to detect Streptococcus pyogenes DNA.

## 2024-03-15 NOTE — PATIENT INSTRUCTIONS
"If a strep test was performed:   We will call you with the results of the strep test, in the meantime, information below on viral colds/upper respiratory tract infections were provided (on average the test takes about 30-60 minutes to return).    If the strep test returns \"POSITIVE\" for strep, you will be prescribed an antibiotic, if this is the case, please take the entire course of antibiotic, even if feeling better prior to this. Continue with symptomatic treatment below as needed. If positive, please change toothbrush on day 2.     If the strep test returns \"NEGATIVE\" you do not need antibiotics and are to continue with conservative treatment as outlined below. Continue to monitor symptoms.       If a COVID swab was performed:   Please quarantine until results return which takes 24-72 hours, unless informed otherwise.     If COVID testing is negative, symptoms are improving (no need for antipyretics/fever reducers, etc.), that patient can return to normal activities of daily living.    If you test positive for COVID (regardless of vaccination status): stay home for 5 days. If you have no symptoms or symptoms are resolving after 5 days, you may leave the house per CDC guidelines. Continue to wear a mask around others for 5 days. You should also be fever free for 24 hours without the use of fever reducers (Tylenol/Ibuprofen).     If Influenza positive, 5-day quarantine from symptom onset and fever free for 24 hours (in addition to this). Follow school/employer guidelines     If RSV positive, follow school/employer guideline + fever free for 24 hours.     Please refer to your AVS for follow up and pain/symptoms management recommendations (I.e.: medications, helpful conservative treatment modalities, appropriate follow up if need to a specialist or family practice, etc.). Please return to urgent care if your symptoms change or worsen.     Discharge instructions:  -If you were prescribed a medication(s), please take " this as prescribed/directed  -Monitor your symptoms, if changing/worsening, return to UC/ER or PCP for follow up    Symptomatic treatments recommended.  - Antibiotics will not help with your symptoms, unless you were told otherwise today (strep throat, ear infection, etc. ). Education provided on symptoms of secondary bacterial infection such as new fever, chills, rigors, shortness of breath, increased work of breathing, that can occur with viral URI and need for further evaluation, if they occur.   - Ensure you are staying hydrated by drinking plenty of fluids and eating mild foods and advance diet as tolerated  - Honey can be soothing for sore throat (as long as above 12 months of age)  - Warm salt water gurgles can help soothe sore throat  - Humidifier can help with congestion and help keep mucus membranes such as throat and nose from drying out.  - Sleeping slightly propped up can help with congestion and postnasal drainage that can worsen cough at bedtime.  - As long as you have never been told to take Tylenol and/or Ibuprofen you can use them to manage fever and body aches per package instructions  Make sure you eat when you take ibuprofen to avoid stomach upset.  - OTC cough medications per package instructions to help with cough. Check to see if the cough/cold medication already has acetaminophen (Tylenol) in it. If it does avoid taking additional Tylenol.  - If sudden onset of new fever, worsening symptoms return for further evaluation.  - OTC antihistamine such as Allegra, Zyrtec, Claritin (generic is okay) can help with nasal/sinus congestion and OTC nasal steroid such as Flonase can help decrease sinus inflammation to help with congestion.  - Education provided on symptoms of post-viral bacterial infections including ear infection and pneumonia. This would require re-evaluation for treatment.

## 2024-03-15 NOTE — NURSING NOTE
"Chief Complaint   Patient presents with    Ear Problem     X 3 days    Throat Problem    Sinus Problem     Cough x 3 days     Tx with Advil with some relief.    Initial /64 (BP Location: Left arm, Patient Position: Sitting, Cuff Size: Adult Regular)   Pulse 72   Temp 98.9  F (37.2  C) (Tympanic)   Resp 12   Ht 1.753 m (5' 9\")   Wt 84.8 kg (187 lb)   LMP 03/08/2024 (Approximate)   SpO2 97%   BMI 27.62 kg/m   Estimated body mass index is 27.62 kg/m  as calculated from the following:    Height as of this encounter: 1.753 m (5' 9\").    Weight as of this encounter: 84.8 kg (187 lb).     Advance Care Directive on file? no    FOOD SECURITY SCREENING QUESTIONS:    The next two questions are to help us understand your food security.  If you are feeling you need any assistance in this area, we have resources available to support you today.    Hunger Vital Signs:  Within the past 12 months we worried whether our food would run out before we got money to buy more. Never  Within the past 12 months the food we bought just didn't last and we didn't have money to get more. Never  Terra Maria LPN,KASHMIR on 3/15/2024 at 2:15 PM      Terra Maria LPN       "

## 2024-03-18 PROBLEM — K59.00 CONSTIPATION: Status: RESOLVED | Noted: 2018-02-23 | Resolved: 2024-03-18

## 2024-03-19 NOTE — PROGRESS NOTES
"Service Date: 3/21/2024    JORGE A Allred  [facility]      RE:  Katrina Hillman   MRN:  [id number]   :  2005       Dear Ms. Faith:    It was a pleasure participating in the care of your patient, Katrina Hillman .  As you know, she is a 18 year old year old person who I met over a virtual visit today for palpitations with an EKG demonstrating accessory pathway, blood pressure control, tobacco abuse .    Past medical history is significant for the followin.  Depression with history of intentional drug overdose and self cutting  2.  THC abuse  3.  Tobacco abuse  4.  Hernia repair  5.  ADHD per her verbal history     The patient was seen by Dr. Shore and electrophysiology on 2022.  At that time Zio patch monitor  through 2019 revealed that her symptoms of \"fluttering corresponded to sinus tachycardia at a rate of 151 bpm without significant preexcitation.     Dr. Shore suggested that the preexcitation was only visible at heart rates less than 60, suggesting weak antegrade pathway with decreased risk of malignant arrhythmias.  She suggested clinical monitoring at that time.    I last saw her 2024, and at that time we had ordered an echocardiogram, pulmonary function tests and a 14-day Zio patch monitor.    The echo revealed normal LV systolic function without gross valvular pathology, atrial septal aneurysm with small PFO incidentally noted.  PFTs revealed an FEV1 of 4.2, FVC 5.0 representing 106 and 111% predicted, DLCO 98%    Zio patch monitor 3/7/2024 revealed normal sinus rhythm the patient events corresponded to normal sinus rhythm, rare supraventricular and ventricular ectopy, no sig arrhythmias identified    Since her last visit from a clinical standpoint, she has done well and has no longer had any palpitations.  She works out with dumbbells for 20 minutes and takes an hour walk every day.  She denies any exertionally limiting symptoms and feels good during " exercise.  She denies any new dizzy episodes, palpitations, syncope or near syncope.    She has been drinking a gallon of water a day and has not had trouble with dizziness or lightheadedness due to her low blood pressures.    She does note that her ADHD has been acting up and that she has been talking to you about starting some form of stimulant specifically Vyvanse (a pro drug that is metabolized into amphetamine type stimulant)    The patient otherwise denies gross chest pain, shortness of breath, PND, orthopnea, edema, palpitations, syncope or near syncope.    10 Point Review of Systems: Unremarkable aside from her ADHD    MEDICATIONS:    1.  BuSpar  2.  Gabapentin      PHYSICAL EXAM:    3/15/2024: Blood pressure 104/64, pulse 72, weight 187 pounds  General:  Patient appears comfortable, well groomed  Psych:  Patient is alert and oriented X 3  Eyes:  No gross erythema, exudate  Respiratory:  Patient is breathing comfortably without gross cough    The remainder of the comprehensive physical exam was deferred secondary to the COVID-19 pandemic and secondary to virtual visit restrictions.    LABS:    12/28/2023: Potassium 3.8, GFR normal, hemoglobin normal  9/21/2023: TSH normal     Carotid ultrasound 2/8/2022 reveals possible moderate stenosis of the right carotid, left normal, no plaque identified     CTA head neck 8/17/2022 reveals no significant stenoses of the carotids or vertebral arteries     EKG 12/28/2023 reveals normal sinus rhythm with significant preexcitation present and delta waves with RSR prime noted in V1 V2     Echocardiogram 1/31/2024 reveals normal LV systolic function, no significant valvular pathology identified, atrial septal aneurysm with small PFO incidentally noted normal RV size and systolic function    Zio patch monitor 3/7/2024 revealed normal sinus rhythm the patient events corresponded to normal sinus rhythm, rare supraventricular and ventricular ectopy, no segment arrhythmias  "identified    IMPRESSION:    Katrina is a 18-year-old lady whose past medical history significant for depression and self-harm, THC abuse and tobacco abuse who presents with several active issues:     1.  Palpitations with EKG positive for accessory pathway     Historically, she is felt recent palpitations manifested by her heart \"skipping and/or pounding hard\".  Her echocardiogram 1/31/2024 reveals normal LV systolic function, no significant valvular pathology identified, atrial septal aneurysm with small PFO incidentally noted normal RV size and systolic function    Zio patch monitor 3/7/2024 revealed normal sinus rhythm the patient events corresponded to normal sinus rhythm, rare supraventricular and ventricular ectopy, no significant arrhythmias identified    From a clinical standpoint, she has no longer had any palpitations and is feeling good.  Continue clinical monitoring would be indicated      2.  Blood pressure control     Her baseline blood pressures tend to run low, and in fact she experiences occasional orthostasis when her fluid intake is not adequate.  She currently drinks a gallon of water a day and this has been successful in preventing any type of orthostasis.    3.  ADHD treatment    The patient has been requesting a stimulant to combat her ADHD.    From a cardiac standpoint this would not be an ideal drug for her due to the fact that obviously stimulants are known for exacerbating arrhythmias, particularly in the context of accessory pathway being present.    We discussed this in detail today, however the patient claims that her quality of life is so low and her ability to function in her everyday life is nearly impossible if her condition is not treated with a stimulant.  She feels that she is willing to forego the increased risk of taking this type of medication in order to maintain functionality in her everyday life.  She understands that by taking a stimulant this could lead to potentially " "dangerous and even fatal arrhythmias.  She still wishes to proceed    PLAN:    1.  We reviewed her echocardiogram and Zio patch monitor results today both of which were unremarkable.    2.  Again, she wishes to start taking a stimulant to help treat her ADHD, accepting the increased risks that it presents as mentioned above.  If she chooses to do this, I would recommend that she start with the lowest possible dose, and make small incremental changes on an every 2 week basis to watch for clinical symptoms (palpitations, side effects, etc.)  She was instructed to hold the medication if any side effects or untoward effects were noted.    Once again, it was a pleasure participating in the care of your patient, Katrina Hillman .  Please feel free to contact me at any time if there are any questions regarding her care in the future.      Sincerely,          Orville Sanders M.D.  Cardiovascular Division  [facility]      The patient has been notified of following:     \"This video visit will be conducted via a call between you and your physician/provider. We have found that certain health care needs can be provided without the need for an in-person physical exam.  This service lets us provide the care you need with a video conversation.  If a prescription is necessary we can send it directly to your pharmacy.  If lab work is needed we can place an order for that and you can then stop by our lab to have the test done at a later time.    Video visits are billed at different rates depending on your insurance coverage.  Please reach out to your insurance provider with any questions.    If during the course of the call the physician/provider feels a video visit is not appropriate, you will not be charged for this service.\"    Patient has given verbal consent for video visit? Yes    How would you like to obtain your AVS? Mail    Video-Visit Details    Type of service:  Video Visit    Video Start Time:905am    Video End " Time:925am    Total visit time including video visit, chart review, charting, coordination of care =43min    Originating Location (pt. Location):patient home      Distant Location (provider location):   Off site home office    Platform used for Video Visit: Rosales ROBLERO#:[id number]

## 2024-03-21 ENCOUNTER — VIRTUAL VISIT (OUTPATIENT)
Dept: CARDIOLOGY | Facility: OTHER | Age: 19
End: 2024-03-21
Attending: INTERNAL MEDICINE
Payer: COMMERCIAL

## 2024-03-21 VITALS — WEIGHT: 185 LBS | HEIGHT: 69 IN | BODY MASS INDEX: 27.4 KG/M2

## 2024-03-21 DIAGNOSIS — R00.2 PALPITATIONS: Primary | ICD-10-CM

## 2024-03-21 PROCEDURE — 99215 OFFICE O/P EST HI 40 MIN: CPT | Mod: 95 | Performed by: INTERNAL MEDICINE

## 2024-03-21 ASSESSMENT — PAIN SCALES - GENERAL: PAINLEVEL: NO PAIN (0)

## 2024-03-21 NOTE — NURSING NOTE
No other vitals to report today    PHQ declined today, completes often/already being seen per pt     Is the patient currently in the state of [place]? YES    Visit mode:VIDEO    If the visit is dropped, the patient can be reconnected by: VIDEO VISIT: Text to cell phone:   Telephone Information:   Mobile [phone]       Will anyone else be joining the visit? NO  (If patient encounters technical issues they should call [phone]    How would you like to obtain your AVS? MyChart    Are changes needed to the allergy or medication list?  Pt states also taking Multi vitamin, magnesium, and Biotin. Unable to reconcile to medication list     Reason for visit: CLAIRE Alaniz MA VVF

## 2024-04-29 ENCOUNTER — OFFICE VISIT (OUTPATIENT)
Dept: FAMILY MEDICINE | Facility: OTHER | Age: 19
End: 2024-04-29
Attending: NURSE PRACTITIONER
Payer: COMMERCIAL

## 2024-04-29 VITALS
RESPIRATION RATE: 16 BRPM | TEMPERATURE: 97.7 F | WEIGHT: 182.9 LBS | OXYGEN SATURATION: 99 % | BODY MASS INDEX: 27.09 KG/M2 | DIASTOLIC BLOOD PRESSURE: 72 MMHG | SYSTOLIC BLOOD PRESSURE: 108 MMHG | HEART RATE: 75 BPM | HEIGHT: 69 IN

## 2024-04-29 DIAGNOSIS — R19.8 PAIN WITH BOWEL MOVEMENTS: ICD-10-CM

## 2024-04-29 DIAGNOSIS — K92.1 BLOOD IN THE STOOL: ICD-10-CM

## 2024-04-29 DIAGNOSIS — K62.89 RECTAL PAIN: Primary | ICD-10-CM

## 2024-04-29 LAB
BASOPHILS # BLD AUTO: 0 10E3/UL (ref 0–0.2)
BASOPHILS NFR BLD AUTO: 0 %
CRP SERPL-MCNC: <3 MG/L
EOSINOPHIL # BLD AUTO: 0.2 10E3/UL (ref 0–0.7)
EOSINOPHIL NFR BLD AUTO: 2 %
ERYTHROCYTE [DISTWIDTH] IN BLOOD BY AUTOMATED COUNT: 12.9 % (ref 10–15)
HCT VFR BLD AUTO: 41.5 % (ref 35–47)
HGB BLD-MCNC: 13.6 G/DL (ref 11.7–15.7)
IMM GRANULOCYTES # BLD: 0 10E3/UL
IMM GRANULOCYTES NFR BLD: 0 %
LYMPHOCYTES # BLD AUTO: 2.5 10E3/UL (ref 0.8–5.3)
LYMPHOCYTES NFR BLD AUTO: 23 %
MCH RBC QN AUTO: 29.8 PG (ref 26.5–33)
MCHC RBC AUTO-ENTMCNC: 32.8 G/DL (ref 31.5–36.5)
MCV RBC AUTO: 91 FL (ref 78–100)
MONOCYTES # BLD AUTO: 0.6 10E3/UL (ref 0–1.3)
MONOCYTES NFR BLD AUTO: 5 %
NEUTROPHILS # BLD AUTO: 7.8 10E3/UL (ref 1.6–8.3)
NEUTROPHILS NFR BLD AUTO: 70 %
NRBC # BLD AUTO: 0 10E3/UL
NRBC BLD AUTO-RTO: 0 /100
PLATELET # BLD AUTO: 181 10E3/UL (ref 150–450)
RBC # BLD AUTO: 4.56 10E6/UL (ref 3.8–5.2)
WBC # BLD AUTO: 11.2 10E3/UL (ref 4–11)

## 2024-04-29 PROCEDURE — 99214 OFFICE O/P EST MOD 30 MIN: CPT | Performed by: NURSE PRACTITIONER

## 2024-04-29 PROCEDURE — 85025 COMPLETE CBC W/AUTO DIFF WBC: CPT | Mod: ZL | Performed by: NURSE PRACTITIONER

## 2024-04-29 PROCEDURE — 86140 C-REACTIVE PROTEIN: CPT | Mod: ZL | Performed by: NURSE PRACTITIONER

## 2024-04-29 PROCEDURE — 36415 COLL VENOUS BLD VENIPUNCTURE: CPT | Mod: ZL | Performed by: NURSE PRACTITIONER

## 2024-04-29 RX ORDER — GABAPENTIN 100 MG/1
CAPSULE ORAL
COMMUNITY
Start: 2024-04-01

## 2024-04-29 RX ORDER — SPIRONOLACTONE 100 MG/1
1 TABLET, FILM COATED ORAL
COMMUNITY
Start: 2024-04-01

## 2024-04-29 RX ORDER — MULTIVITAMIN WITH IRON
1 TABLET ORAL DAILY
COMMUNITY

## 2024-04-29 ASSESSMENT — PATIENT HEALTH QUESTIONNAIRE - PHQ9
SUM OF ALL RESPONSES TO PHQ QUESTIONS 1-9: 23
10. IF YOU CHECKED OFF ANY PROBLEMS, HOW DIFFICULT HAVE THESE PROBLEMS MADE IT FOR YOU TO DO YOUR WORK, TAKE CARE OF THINGS AT HOME, OR GET ALONG WITH OTHER PEOPLE: VERY DIFFICULT
SUM OF ALL RESPONSES TO PHQ QUESTIONS 1-9: 23

## 2024-04-29 ASSESSMENT — PAIN SCALES - GENERAL: PAINLEVEL: NO PAIN (0)

## 2024-04-29 NOTE — PROGRESS NOTES
ASSESSMENT/PLAN:     I have reviewed the nursing notes.  I have reviewed the findings, diagnosis, plan and need for follow up with the patient.      1. Rectal pain  2. Pain with bowel movements  3. Blood in the stool  - CBC and Differential  - CRP inflammation  - Adult General Surg Referral; Future      New onset persistent rectal pain/pressure with frequent soft painful bowel movements with intermittent blood over the past 1 month.  Denies vomiting, diarrhea, fevers, or weight loss.    CBC and Differential with minimally persistently elevated WBC of 11.2 with normal differential and normal hemoglobin  CRP inflammation normal/non-elevated at <3.0    Stable for follow up with primary care     Referral to general surgery   Follow up appointment scheduled with primary care - 5/13/24      I explained my diagnostic considerations and recommendations to the patient, who voiced understanding and agreement with the treatment plan. All questions were answered. We discussed potential side effects of any prescribed or recommended therapies, as well as expectations for response to treatments.    Vangie Araiza NP  [facility]      SUBJECTIVE:   Katrina Hillman is a 19 year old female who presents to clinic today for the following health issues:  GI concerns      HPI  Patient with painful bowel movements, rectal pain with pressure, and intermittent blood noted in stool for the past month.  Stools are mostly daily.  Rare diarrhea.  Random migratory abdominal pain.  Appetite decreased the past 2 months.    States fluctuates by 5 pounds.   Random nausea, denies vomiting.  States hx of hemorrhoids and rectal fissures when she was younger.   No known family hx of colitis, ulcerative colitis or crohn disease.  Denies use of any OTC laxatives, softeners or fiber supplements (states fiber cereal gave her diarrhea).    Denies light headedness or dizziness.  No fevers.  States increased depression and sees a  therapist at [facility].    She is currently having menstrual spotting last month and currently the past 3 days about 2 weeks after her last cycle.    No current birth control.  Denies any current sexual activity in the past 4 months.          Past Medical History:   Diagnosis Date     Abdominal pain     08,x6 months thought to be secondary to GERD (upper GI with small bowel followthrough scheduled)     Anxiety      Carotid bruit      Constipation     No Comments Provided     Dyspnea      Gastroesophageal reflux disease with esophagitis      High risk heterosexual behavior 2017    G1      History of Pamella-Parkinson-White (WPW) syndrome     seen on  richardson     Major depressive disorder, single episode     1/15/2016     Nicotine use disorder 2018     Palpitations      Pneumonia     2003,1 day hospitalization     Tetrahydrocannabinol (THC) use disorder, mild, abuse 2018     Tic disorder     2012,both motor and verbal     Past Surgical History:   Procedure Laterality Date     DILATION AND CURETTAGE SUCTION  2022    IAB     DILATION AND CURETTAGE SUCTION N/A 2022    Procedure: DILATION AND CURETTAGE, UTERUS, USING SUCTION;  Surgeon: Jen Cisneros MD;  Location: [facility]     ESOPHAGOSCOPY, GASTROSCOPY, DUODENOSCOPY (EGD), COMBINED N/A 2018    Procedure: COMBINED ESOPHAGOSCOPY, GASTROSCOPY, DUODENOSCOPY (EGD), BIOPSY SINGLE OR MULTIPLE;  Gastroscopy;  Surgeon: Sreekanth Shaw MD;  Location: [facility] OR     HERNIA REPAIR       05,,HERNIA REPAIR,Repair of an epigastric and umbilical     Social History     Tobacco Use     Smoking status: Former     Current packs/day: 0.00     Average packs/day: 0.5 packs/day for 5.0 years (2.5 ttl pk-yrs)     Types: Cigarettes     Start date: 2017     Quit date: 2022     Years since quittin.3     Passive exposure: Never     Smokeless tobacco: Never   Substance Use Topics     Alcohol use: Yes     Comment: occasional  "    Current Outpatient Medications   Medication Sig Dispense Refill     busPIRone (BUSPAR) 5 MG tablet 10 mg       Cholecalciferol (VITAMIN D3) 50 MCG (2000 UT) TABS Take 3 capsules by mouth every morning Take with food        clindamycin-benzoyl peroxide (BENZACLIN) 1-5 % external gel APPLY TOPICALLY TO FACE 1 TIME IN THE MORNING AFTER WASHING. FOLLOW WITH A MOISTURIZER AS NEEDED       gabapentin (NEURONTIN) 100 MG capsule        magnesium 250 MG tablet Take 1 tablet by mouth daily       spironolactone (ALDACTONE) 100 MG tablet Take 1 tablet by mouth daily at 2 pm       tretinoin (RETIN-A) 0.1 % external cream        OLANZapine (ZYPREXA) 5 MG tablet Take 5 mg by mouth at bedtime  TAKE 1-2 TABLETS BY MOUTH EVERY NIGHT AT BEDTIME AS NEEDED FOR AGITATION/ANXIETY/SHELLI/PSYCHOTIC SYMPTOMS (Patient not taking: Reported on 3/15/2024)       No Known Allergies      Past medical history, past surgical history, current medications and allergies reviewed and accurate to the best of my knowledge.        OBJECTIVE:     /72 (BP Location: Right arm, Patient Position: Sitting, Cuff Size: Adult Regular)   Pulse 75   Temp 97.7  F (36.5  C) (Tympanic)   Resp 16   Ht 1.753 m (5' 9\")   Wt 83 kg (182 lb 14.4 oz)   LMP 04/14/2024 (Exact Date)   SpO2 99%   BMI 27.01 kg/m    Body mass index is 27.01 kg/m .        Physical Exam  General Appearance: Well appearing adolescent female, appropriate appearance for age. No acute distress  Respiratory: normal chest wall and respirations.  Normal effort.  No cough appreciated.  Abdomen: soft, nontender, no rigidity, no rebound tenderness or guarding, normal bowel sounds present  :  No suprapubic tenderness to palpation.  No CVA tenderness to palpation.    Musculoskeletal:  Equal movement of bilateral upper extremities.  Equal movement of bilateral lower extremities.  Normal gait.    Psychological: normal affect, alert, oriented, and pleasant.       Labs:  Results for orders placed or " performed in visit on 04/29/24   CRP inflammation     Status: Normal   Result Value Ref Range    CRP Inflammation <3.00 <5.00 mg/L   CBC with platelets and differential     Status: Abnormal   Result Value Ref Range    WBC Count 11.2 (H) 4.0 - 11.0 10e3/uL    RBC Count 4.56 3.80 - 5.20 10e6/uL    Hemoglobin 13.6 11.7 - 15.7 g/dL    Hematocrit 41.5 35.0 - 47.0 %    MCV 91 78 - 100 fL    MCH 29.8 26.5 - 33.0 pg    MCHC 32.8 31.5 - 36.5 g/dL    RDW 12.9 10.0 - 15.0 %    Platelet Count 181 150 - 450 10e3/uL    % Neutrophils 70 %    % Lymphocytes 23 %    % Monocytes 5 %    % Eosinophils 2 %    % Basophils 0 %    % Immature Granulocytes 0 %    NRBCs per 100 WBC 0 <1 /100    Absolute Neutrophils 7.8 1.6 - 8.3 10e3/uL    Absolute Lymphocytes 2.5 0.8 - 5.3 10e3/uL    Absolute Monocytes 0.6 0.0 - 1.3 10e3/uL    Absolute Eosinophils 0.2 0.0 - 0.7 10e3/uL    Absolute Basophils 0.0 0.0 - 0.2 10e3/uL    Absolute Immature Granulocytes 0.0 <=0.4 10e3/uL    Absolute NRBCs 0.0 10e3/uL   CBC and Differential     Status: Abnormal    Narrative    The following orders were created for panel order CBC and Differential.  Procedure                               Abnormality         Status                     ---------                               -----------         ------                     CBC with platelets and d...[[id number]]  Abnormal            Final result                 Please view results for these tests on the individual orders.

## 2024-04-29 NOTE — PROGRESS NOTES
"Chief Complaint   Patient presents with    Rectal Problem     Painful BM, occasional blood in stools   Patient presents today with painful BM and blood in stool. She said this first began about 1 month ago. She has had painful BM's daily but the blood in stool has been on and off.         Initial /72 (BP Location: Right arm, Patient Position: Sitting, Cuff Size: Adult Regular)   Pulse 75   Temp 97.7  F (36.5  C) (Tympanic)   Resp 16   Ht 1.753 m (5' 9\")   Wt 83 kg (182 lb 14.4 oz)   LMP 04/14/2024 (Exact Date)   SpO2 99%   BMI 27.01 kg/m   Estimated body mass index is 27.01 kg/m  as calculated from the following:    Height as of this encounter: 1.753 m (5' 9\").    Weight as of this encounter: 83 kg (182 lb 14.4 oz).     FOOD SECURITY SCREENING QUESTIONS:    The next two questions are to help us understand your food security.  If you are feeling you need any assistance in this area, we have resources available to support you today.    Hunger Vital Signs:  Within the past 12 months we worried whether our food would run out before we got money to buy more. Never  Within the past 12 months the food we bought just didn't last and we didn't have money to get more. Never      Virginia Varela    "

## 2024-05-07 NOTE — PROGRESS NOTES
I spoke to pt to let her know that her RX for enalapirl was sent to [facility] pharmacy on 04/12/20 with 3 refills. Pt verbalized understanding.    Patient Information     Patient Name MRN Sex Katrina Mason [id number] Female 2005      Progress Notes by Vangie Araiza NP at 2017  2:00 PM     Author:  Vangie Araiza NP Service:  (none) Author Type:  PHYS- Nurse Practitioner     Filed:  2017  3:37 PM Encounter Date:  2017 Status:  Signed     :  Vangie Araiza NP (PHYS- Nurse Practitioner)            HPI:    Katrina Hillman is a 12 y.o. female who presents to clinic today with mother for ear infection.  No recent ear piercing.  States she has been using an ear stretcher for the past year.  Started with swelling behind right ear 2 days ago.  Yesterday the right ear lobe became red with swelling and redness going into side of neck and jaw.  No fevers.  Difficulty opening mouth.  No difficulty chewing.  No difficulty swallowing.   Washing the infected area with epsom salt water initially and just plain water recently.            Past Medical History:     Diagnosis  Date     Abdominal pain 04/24/08    x6 months thought to be secondary to GERD (upper GI with small bowel followthrough scheduled)      Constipation      Depression in pediatric patient 1/15/2016     High risk sexual behavior 2017     Oral contraception initial prescription 2017     Overweight     improved between [age] to [age] years old.       Pneumonia 2003    1 day hospitalization      Tic disorder 2012    both motor and verbal, probable Tourettes      Past Surgical History:      Procedure  Laterality Date     HERNIA REPAIR   05    Repair of an epigastric and umbilical        Social History     Substance Use Topics       Smoking status: Never Smoker     Smokeless tobacco: Never Used     Alcohol use No     Current Outpatient Prescriptions       Medication  Sig Dispense Refill     CRYSELLE 0.3-30 mg-mcg tablet TAKE 1 TABLET BY MOUTH ONCE DAILY *START THIS * 84 tablet 0     escitalopram oxalate (LEXAPRO) 10 mg tablet TAKE 2 TABLETS BY  "MOUTH ONCE DAILY 30 tablet 0     No current facility-administered medications for this visit.      Medications have been reviewed by me and are current to the best of my knowledge and ability.    No Known Allergies    Past medical history, past surgical history, current medications and allergies reviewed and accurate to the best of my knowledge.        ROS:  Refer to HPI    /64  Pulse 60  Temp 99.1  F (37.3  C) (Tympanic)   Ht 1.727 m (5' 8\")  Wt 78 kg (172 lb)  LMP 07/23/2017  BMI 26.15 kg/m2    EXAM:  General Appearance: Well appearing female adolescent, appropriate appearance for age. No acute distress  Head: normocephalic, atraumatic  Ears: Right TM with bony landmarks appreciated, no erythema, no effusion, no bulging, no purulence.  Right auditory canal clear.  Right ear lobe with swelling and pain.    Eyes: conjunctivae normal, no drainage  Orophayrnx: moist mucous membranes, posterior pharynx without erythema, tonsils without hypertrophy, no erythema, no exudates or petechiae, no post nasal drip seen.  No oral lesions.  No drooling.  No trismus.    Neck: Right anterior and posterior cervical lymph nodes with diffuse solid enlargement and tenderness to palpation.  Left side without enlargement or tenderness.    Respiratory: normal chest wall and respirations.  Normal effort.  Clear to auscultation bilaterally, no wheezing, crackles or rhonchi.  No increased work of breathing.  No cough appreciated.  Cardiac: RRR with no murmurs  Musculoskeletal:  Normal ROM of neck.  Normal gait.  Equal movement of bilateral upper extremities.  Equal movement of bilateral lower extremities.    Dermatological: Right ear lobule with swelling, erythema, and tenderness to palpation.  Right ear lobule with crusting around piercing, no active drainage.  Right side of neck with diffuse swelling, light erythema and tenderness.    Psychological: normal affect, alert and pleasant        ASSESSMENT/PLAN:    ICD-10-CM    1. " Infection of right earlobe L08.9 mupirocin 2% topical (BACTROBAN OINTMENT) ointment      cefTRIAXone injection (ROCEPHIN) 1 g      WOUND CULTURE, STAIN      WOUND CULTURE, STAIN   2. Localized swelling, mass or lump of neck R22.1 cefTRIAXone injection (ROCEPHIN) 1 g   3. Cellulitis of ear, right H60.11 cefTRIAXone injection (ROCEPHIN) 1 g      cephalexin (KEFLEX) 500 mg capsule      dicloxacillin (DYNAPEN) 500 mg capsule         Discussed case and plan of care with ER MD (Dr. Vazquez) with recommendations of: Dicloxacillin and Cephalexin along with Rocephin injection.  No need for imaging at this time.  Wound culture pending  Cephalexin 500 mg TID x 10 days  Dicloxacillin 500 mg TID x 10 days   Rocephin 1 gram IM injection administered in clinic  Avoid use of piercing or weights or stretchers in right ear until fully healed   Wash earlobe BID with warm soapy water.    Apply Mupirocin ointment TID.  Warm compresses PRN  Tylenol or ibuprofen PRN  Follow up for recheck in 2 days and sooner if symptoms persist or worsen or concerns          Patient Instructions   Rocephin shot given in clinilc    Dicloxacillin every 8 hours x 10 days    Cephalexin every 8 hours x 10 days    Mupirocin ointment 3 times per day to ear lobe skin    Warm compresses    Wash with soapy water twice daily    Follow up for recheck in 2 days and sooner if worsening

## 2024-05-09 NOTE — PROGRESS NOTES
Assessment & Plan     1. Rectal pain  Patient reported diagnosed with anal fissure at rapid clinic visit however this was not documented in their note.  Patient declined repeating exam today as she has follow-up with surgery scheduled for 5/21.  Discussed additional evaluation with further lab work including CMP and TSH however this was also deferred.  Unable to get topical numbing medication covered through insurance, did prescribe nitroglycerin ointment to use for management as below.  Encouraged good hydration, appropriate fiber intake, physical activity as able.  Follow-up with primary care as needed in the meantime, otherwise with surgery as scheduled on 5/21.  - nitroGLYcerin (RECTIV) 0.4 % OINT rectal ointment; Place 1 inch (1.5 mg) rectally every 12 hours  Dispense: 30 g; Refill: 0    2. Situational anxiety  To be used 30 to 60 minutes before MRI scheduled for follow-up on pituitary mass tomorrow.  PDMP reviewed and appears appropriate.  - ALPRAZolam (XANAX) 0.5 MG tablet; Take 1-2 tablets 30-60 minutes before MRI  Dispense: 2 tablet; Refill: 0      No follow-ups on file.    Bryant Herndon is a 19 year old, presenting for the following health issues:  Follow Up ([facility] visit, blood in stool) and Rectal Problem    History of Present Illness       Reason for visit:  Anal pain/blood  Symptom onset:  More than a month  Symptoms include:  Blood in stool  Symptom intensity:  Moderate  Symptom progression:  Staying the same  Had these symptoms before:  Yes  Has tried/received treatment for these symptoms:  Yes  Previous treatment was successful:  Yes  Prior treatment description:  Cream  What makes it worse:  Unsure  What makes it better:  No    She eats 0-1 servings of fruits and vegetables daily.She consumes 1 sweetened beverage(s) daily.She exercises with enough effort to increase her heart rate 10 to 19 minutes per day.  She exercises with enough effort to increase her heart rate 3 or less days per week.    She is taking medications regularly.     Here for rapid clinic follow-up.  Patient was seen at the clinic on 4/29 for evaluation of irregular bowel movements, on and off blood in stool.  Patient reports she was told that she has a small anal fissure around 6:00 however this was not documented in the rapid clinic visit note.  They had recommended using Vaseline for symptomatic management.  She reports she is still struggling with quite a bit of pain with defecation at this area.  Reports she continues to struggle with some on and off irregular bowel movements that does seem to correlate with what she is eating.  Has had a slightly decreased appetite and has not been eating quite as well.  Does have a history of hemorrhoids and fissures when she was younger.  Has not been using over-the-counter stool softeners, fiber supplements, laxatives recently.  Labs including CBC and CRP were updated at rapid clinic visit.  Patient does have follow-up with surgery scheduled for 5/21/2024.    She also has a follow-up brain MRI for pituitary mass scheduled for tomorrow.  She was previously unaware that she could have a medication to help with anxiety claustrophobia.  She would like one-time medication to take for this.  She will be traveling with a friend who can drive her home.    PAST MEDICAL HISTORY:   Past Medical History:   Diagnosis Date    Abdominal pain     04/24/08,x6 months thought to be secondary to GERD (upper GI with small bowel followthrough scheduled)    Anxiety     Carotid bruit     Constipation     No Comments Provided    Dyspnea     Gastroesophageal reflux disease with esophagitis     High risk heterosexual behavior 04/14/2017    G1 2/22    History of Pamella-Parkinson-White (WPW) syndrome     seen on 2019 [facility]son    Major depressive disorder, single episode     1/15/2016    Nicotine use disorder 07/20/2018    Palpitations     Pneumonia     2003,1 day hospitalization    Tetrahydrocannabinol (THC) use disorder,  mild, abuse 2018    Tic disorder     2012,both motor and verbal       PAST SURGICAL HISTORY:   Past Surgical History:   Procedure Laterality Date    DILATION AND CURETTAGE SUCTION  2022    IAB    DILATION AND CURETTAGE SUCTION N/A 2022    Procedure: DILATION AND CURETTAGE, UTERUS, USING SUCTION;  Surgeon: Jen Cisneros MD;  Location: [facility] OR    ESOPHAGOSCOPY, GASTROSCOPY, DUODENOSCOPY (EGD), COMBINED N/A 2018    Procedure: COMBINED ESOPHAGOSCOPY, GASTROSCOPY, DUODENOSCOPY (EGD), BIOPSY SINGLE OR MULTIPLE;  Gastroscopy;  Surgeon: Sreekanth Shaw MD;  Location: [facility] OR    HERNIA REPAIR       05,,HERNIA REPAIR,Repair of an epigastric and umbilical       FAMILY HISTORY:   Family History   Problem Relation Age of Onset    Cancer Mother         Cancer,Hodgkin's lymphoma as teenager.    Other - See Comments Father         GI Disease,Ulcer, hernia    Bleeding Disorder Sister         Bloody noses    Heart Defect Maternal Grandfather     Other - See Comments Paternal Uncle         tics in childhood    Anesthesia Reaction No family hx of        SOCIAL HISTORY:   Social History     Tobacco Use    Smoking status: Former     Current packs/day: 0.00     Average packs/day: 0.5 packs/day for 5.0 years (2.5 ttl pk-yrs)     Types: Cigarettes     Start date: 2017     Quit date: 2022     Years since quittin.3     Passive exposure: Never    Smokeless tobacco: Never   Substance Use Topics    Alcohol use: Yes     Comment: occasional      No Known Allergies  Current Outpatient Medications   Medication Sig Dispense Refill    ALPRAZolam (XANAX) 0.5 MG tablet Take 1-2 tablets 30-60 minutes before MRI 2 tablet 0    busPIRone (BUSPAR) 10 MG tablet       Cholecalciferol (VITAMIN D3) 50 MCG ( UT) TABS Take 3 capsules by mouth every morning Take with food       clindamycin-benzoyl peroxide (BENZACLIN) 1-5 % external gel APPLY TOPICALLY TO FACE 1 TIME IN THE MORNING AFTER WASHING. FOLLOW WITH A  "MOISTURIZER AS NEEDED      gabapentin (NEURONTIN) 100 MG capsule       magnesium 250 MG tablet Take 1 tablet by mouth daily      nitroGLYcerin (RECTIV) 0.4 % OINT rectal ointment Place 1 inch (1.5 mg) rectally every 12 hours 30 g 0    spironolactone (ALDACTONE) 100 MG tablet Take 1 tablet by mouth daily at 2 pm      tretinoin (RETIN-A) 0.1 % external cream       busPIRone (BUSPAR) 5 MG tablet 10 mg (Patient not taking: Reported on 5/13/2024)      OLANZapine (ZYPREXA) 5 MG tablet Take 5 mg by mouth at bedtime  TAKE 1-2 TABLETS BY MOUTH EVERY NIGHT AT BEDTIME AS NEEDED FOR AGITATION/ANXIETY/SHELLI/PSYCHOTIC SYMPTOMS (Patient not taking: Reported on 3/15/2024)       No current facility-administered medications for this visit.           Objective    /58   Pulse 57   Temp 97.8  F (36.6  C) (Tympanic)   Resp 18   Ht 1.753 m (5' 9\")   Wt 83.7 kg (184 lb 9.6 oz)   LMP 05/11/2024 (Exact Date)   SpO2 98%   BMI 27.26 kg/m    Body mass index is 27.26 kg/m .  Physical Exam   General: Pleasant, in no apparent distress.  Eyes: Sclera are white and conjunctiva are clear bilaterally. Lacrimal apparatus free of erythema, edema, and discharge bilaterally.  Ears: External ears without erythema or edema.   Nose: External nose is symmetrical and free of lesions and deformities.   Skin: No jaundice, pallor, rashes, or lesions.  ALLIE: Patient declined exam  Psych: Appropriate mood and affect.      Signed Electronically by: Joelle Faith PA-C    "

## 2024-05-13 ENCOUNTER — OFFICE VISIT (OUTPATIENT)
Dept: FAMILY MEDICINE | Facility: OTHER | Age: 19
End: 2024-05-13
Attending: PHYSICIAN ASSISTANT
Payer: COMMERCIAL

## 2024-05-13 VITALS
SYSTOLIC BLOOD PRESSURE: 102 MMHG | RESPIRATION RATE: 18 BRPM | HEART RATE: 57 BPM | TEMPERATURE: 97.8 F | BODY MASS INDEX: 27.34 KG/M2 | OXYGEN SATURATION: 98 % | HEIGHT: 69 IN | WEIGHT: 184.6 LBS | DIASTOLIC BLOOD PRESSURE: 58 MMHG

## 2024-05-13 DIAGNOSIS — F41.8 SITUATIONAL ANXIETY: ICD-10-CM

## 2024-05-13 DIAGNOSIS — K62.89 RECTAL PAIN: Primary | ICD-10-CM

## 2024-05-13 PROCEDURE — 99213 OFFICE O/P EST LOW 20 MIN: CPT | Performed by: PHYSICIAN ASSISTANT

## 2024-05-13 RX ORDER — BUSPIRONE HYDROCHLORIDE 10 MG/1
10 TABLET ORAL 3 TIMES DAILY
COMMUNITY
Start: 2024-04-01

## 2024-05-13 RX ORDER — ALPRAZOLAM 0.5 MG
TABLET ORAL
Qty: 2 TABLET | Refills: 0 | Status: SHIPPED | OUTPATIENT
Start: 2024-05-13

## 2024-05-13 RX ORDER — NITROGLYCERIN 4 MG/G
1 OINTMENT RECTAL EVERY 12 HOURS
Qty: 30 G | Refills: 0 | Status: SHIPPED | OUTPATIENT
Start: 2024-05-13

## 2024-05-13 ASSESSMENT — PATIENT HEALTH QUESTIONNAIRE - PHQ9
10. IF YOU CHECKED OFF ANY PROBLEMS, HOW DIFFICULT HAVE THESE PROBLEMS MADE IT FOR YOU TO DO YOUR WORK, TAKE CARE OF THINGS AT HOME, OR GET ALONG WITH OTHER PEOPLE: VERY DIFFICULT
SUM OF ALL RESPONSES TO PHQ QUESTIONS 1-9: 22
SUM OF ALL RESPONSES TO PHQ QUESTIONS 1-9: 22

## 2024-05-13 ASSESSMENT — PAIN SCALES - GENERAL: PAINLEVEL: NO PAIN (0)

## 2024-05-13 NOTE — NURSING NOTE
Patient is here to follow up from rapid clinic visit for blood in stool. States still having off and on ans still painful.     Patient's last menstrual period was 05/11/2024 (exact date).  Medication Reconciliation: complete    Chanel Mendez LPN 5/13/2024 9:44 AM       Advance care directive on file? no  Advance care directive provided to patient? no       Chanel Mendez LPN

## 2024-05-21 ENCOUNTER — OFFICE VISIT (OUTPATIENT)
Dept: SURGERY | Facility: OTHER | Age: 19
End: 2024-05-21
Attending: NURSE PRACTITIONER
Payer: COMMERCIAL

## 2024-05-21 VITALS
OXYGEN SATURATION: 99 % | DIASTOLIC BLOOD PRESSURE: 70 MMHG | TEMPERATURE: 98.6 F | SYSTOLIC BLOOD PRESSURE: 110 MMHG | WEIGHT: 176.8 LBS | BODY MASS INDEX: 26.19 KG/M2 | HEIGHT: 69 IN | RESPIRATION RATE: 12 BRPM | HEART RATE: 63 BPM

## 2024-05-21 DIAGNOSIS — K92.1 BLOOD IN THE STOOL: ICD-10-CM

## 2024-05-21 DIAGNOSIS — R19.8 PAIN WITH BOWEL MOVEMENTS: ICD-10-CM

## 2024-05-21 DIAGNOSIS — K62.89 RECTAL PAIN: ICD-10-CM

## 2024-05-21 PROCEDURE — 99213 OFFICE O/P EST LOW 20 MIN: CPT | Mod: 25 | Performed by: SURGERY

## 2024-05-21 PROCEDURE — 46600 DIAGNOSTIC ANOSCOPY SPX: CPT | Performed by: SURGERY

## 2024-05-21 RX ORDER — DIBUCAINE 1 G/100G
OINTMENT TOPICAL 3 TIMES DAILY PRN
Qty: 60 G | Refills: 11 | Status: SHIPPED | OUTPATIENT
Start: 2024-05-21

## 2024-05-21 ASSESSMENT — PAIN SCALES - GENERAL: PAINLEVEL: MODERATE PAIN (5)

## 2024-05-21 NOTE — PROGRESS NOTES
"Chief Complaint   Patient presents with    Rectal Problem     Rectal pain - approx 2 months       Initial /70 (BP Location: Left arm, Patient Position: Sitting)   Pulse 63   Temp 98.6  F (37  C) (Tympanic)   Resp 12   Ht 1.753 m (5' 9\")   Wt 80.2 kg (176 lb 12.8 oz)   LMP 05/11/2024 (Exact Date)   SpO2 99%   BMI 26.11 kg/m   Estimated body mass index is 26.11 kg/m  as calculated from the following:    Height as of this encounter: 1.753 m (5' 9\").    Weight as of this encounter: 80.2 kg (176 lb 12.8 oz).  Meds Reconciled: complete        Roberta Olmstead RN     "

## 2024-05-22 NOTE — PROGRESS NOTES
Procedure Note    Pre/Post Operative Diagnosis:   Rectal bleeding     Procedure:  Anoscopy     Surgeon: Sreekanth Shaw MD        Indication for the procedure:    This is a 19 year old female patient referred by No Ref-Primary, Physician  with rectal bleeding.    After explaining the procedure the patient wished to proceed.  A clinic nurse was present for the procedure.      Procedure:   Following external examination and digital rectalexamination, a lubricated lighted anoscope was inserted into the rectum.  The obturator was removed and the mucosa was examined while removing the scope.  There was moderate hemorrhoids.  A fissure could not be clearly defined.  The exam was poorly tolerated.     Plan:  See consult for additional details.     Sreekanth Shaw MD ....................  5/22/2024   11:55 AM

## 2024-08-12 ENCOUNTER — OFFICE VISIT (OUTPATIENT)
Dept: FAMILY MEDICINE | Facility: OTHER | Age: 19
End: 2024-08-12
Attending: NURSE PRACTITIONER
Payer: COMMERCIAL

## 2024-08-12 VITALS
DIASTOLIC BLOOD PRESSURE: 60 MMHG | BODY MASS INDEX: 26.84 KG/M2 | HEART RATE: 55 BPM | SYSTOLIC BLOOD PRESSURE: 95 MMHG | RESPIRATION RATE: 16 BRPM | HEIGHT: 69 IN | WEIGHT: 181.2 LBS | OXYGEN SATURATION: 97 % | TEMPERATURE: 98.2 F

## 2024-08-12 DIAGNOSIS — Z72.51 UNPROTECTED SEX: ICD-10-CM

## 2024-08-12 DIAGNOSIS — B37.31 YEAST INFECTION OF THE VAGINA: ICD-10-CM

## 2024-08-12 DIAGNOSIS — N93.9 VAGINAL BLEEDING: Primary | ICD-10-CM

## 2024-08-12 LAB
BACTERIAL VAGINOSIS VAG-IMP: NEGATIVE
C TRACH DNA SPEC QL PROBE+SIG AMP: NEGATIVE
CANDIDA DNA VAG QL NAA+PROBE: DETECTED
CANDIDA GLABRATA / CANDIDA KRUSEI DNA: NOT DETECTED
HCG UR QL: NEGATIVE
N GONORRHOEA DNA SPEC QL NAA+PROBE: NEGATIVE
T VAGINALIS DNA VAG QL NAA+PROBE: NOT DETECTED

## 2024-08-12 PROCEDURE — 87491 CHLMYD TRACH DNA AMP PROBE: CPT | Mod: ZL

## 2024-08-12 PROCEDURE — 99214 OFFICE O/P EST MOD 30 MIN: CPT

## 2024-08-12 PROCEDURE — 0352U MULTIPLEX VAGINAL PANEL BY PCR: CPT | Mod: ZL

## 2024-08-12 PROCEDURE — 81025 URINE PREGNANCY TEST: CPT | Mod: ZL

## 2024-08-12 RX ORDER — FLUCONAZOLE 150 MG/1
150 TABLET ORAL ONCE
Qty: 1 TABLET | Refills: 0 | Status: SHIPPED | OUTPATIENT
Start: 2024-08-12 | End: 2024-08-12

## 2024-08-12 ASSESSMENT — PAIN SCALES - GENERAL: PAINLEVEL: MODERATE PAIN (4)

## 2024-08-12 NOTE — NURSING NOTE
"Chief Complaint   Patient presents with    Vaginal Bleeding     Excessive/ ongoing bleeding     Abdominal Pain   Patient presents today with ongoing / irregular vaginal bleeding. She said her periods are usually pretty regular. However, she said her period was from July 10th- 15th. She has been having random/ irregular bleeding on and off since, is unsure of why as she doesn't use a form of birth control that could be causing this. She also mentioned that she had a Bowel movement the other day, and had more vaginal bleeding and cramping afterwards. Had unprotected sex 2 days before her last actual period. The 2nd time she began to bleed was after intercourse, it was heavier this time.         Initial BP 95/60 (BP Location: Left arm, Patient Position: Sitting, Cuff Size: Adult Regular)   Pulse 55   Temp 98.2  F (36.8  C) (Temporal)   Resp 16   Ht 1.753 m (5' 9\")   Wt 82.2 kg (181 lb 3.2 oz)   LMP  (LMP Unknown)   SpO2 97%   BMI 26.76 kg/m   Estimated body mass index is 26.76 kg/m  as calculated from the following:    Height as of this encounter: 1.753 m (5' 9\").    Weight as of this encounter: 82.2 kg (181 lb 3.2 oz).     FOOD SECURITY SCREENING QUESTIONS:    The next two questions are to help us understand your food security.  If you are feeling you need any assistance in this area, we have resources available to support you today.    Hunger Vital Signs:  Within the past 12 months we worried whether our food would run out before we got money to buy more. Never  Within the past 12 months the food we bought just didn't last and we didn't have money to get more. Never      Virginia Varela    "

## 2024-08-12 NOTE — PROGRESS NOTES
ASSESSMENT/PLAN:    I have reviewed the nursing notes.  I have reviewed the findings, diagnosis, plan and need for follow up with the patient.    1. Yeast infection of the vagina  - fluconazole (DIFLUCAN) 150 MG tablet; Take 1 tablet (150 mg) by mouth once for 1 dose  Dispense: 1 tablet; Refill: 0    Multiplex vaginal panel came back positive for yeast.  Will treat patient's vaginal yeast infection with a single dose of Diflucan.    2. Vaginal bleeding  - Chlamydia trachomatis/Neisseria gonorrhoeae by PCR  - Pregnancy, Urine (HCG)  - Multiplex Vaginal Panel by PCR    Patient had an episode of abnormal bleeding in between her menstrual cycles.  Pregnancy test was negative.  Discussed with patient that the abnormal bleeding could have been due to having recent sexual intercourse.  Patient states that she experienced some vaginal bleeding after having intercourse for the first time in 6 months.  Discussed that the bleeding could have also been due to cervical irritation.  Patient has not yet had her first Pap smear.  Discussed with patient that if she does experience abnormal bleeding and in the future that she should follow-up with a gynecologist.  Discussed that she should also schedule an appointment to get her first Pap smear.    3. Unprotected sex  - Chlamydia trachomatis/Neisseria gonorrhoeae by PCR  - Pregnancy, Urine (HCG)  - Multiplex Vaginal Panel by PCR    Patient has a history of unprotected sex.  GC/chlamydia test was negative.  Discussed following safe sex practices in the future.    Discussed warning signs/symptoms indicative of need to f/u    Follow up if symptoms persist or worsen or concerns    I explained my diagnostic considerations and recommendations to the patient, who voiced understanding and agreement with the treatment plan. All questions were answered. We discussed potential side effects of any prescribed or recommended therapies, as well as expectations for response to treatments.    Zafar DIAZ  MAGGIE Marte CNP  8/12/2024  11:36 AM    HPI:    Katrina Hillman is a 19 year old female who presents to [facility] today for concerns of vaginal bleeding    Genitourinary - Female  Onset: 2 week(s) ago  Description:        Painful urination (Dysuria):No        Increase night time urination (Nocturia): No            Blood in urine (Hematuria):No        More frequent urination :no        Delay in urine (Hesitency):No        Unable to empty (Retention):No        Decrease in urinary flow: No        Incontinence:No        Color of urine: yellow  Accompanying Signs & Symptoms:   Any vaginal symptoms: abnormal vaginal bleeding and vaginal itching  Fever: No  Abdominal/Pelvic Pain: cramping since last night  Back pain: YES  Flank pain: No  Nausea and vomiting: YES - nausea  History:    History of frequent UTI's: No  History of abnormal Pap Smear: turned 19 in April, no pap yet  History of kidney stones: No  Sexually Active: YES - she had bleeding a day after intercourse  Possibility of pregnancy: YES- not currently on birth control and did not use protection during intercourse  LMP: 7/10/24 but had light spotting between 7/28/24 and then heavier bleeding for three days after that. She started spotting again yesterday which is when she is supposed to start her next menstrual cycle.   Progression of Symptoms: (better, worse, same): intermittent  Therapies tried and outcome: none     Past Medical History:   Diagnosis Date    Abdominal pain     04/24/08,x6 months thought to be secondary to GERD (upper GI with small bowel followthrough scheduled)    Anxiety     Carotid bruit     Constipation     No Comments Provided    Dyspnea     Gastroesophageal reflux disease with esophagitis     High risk heterosexual behavior 04/14/2017    G1 2/22    History of Pamella-Parkinson-White (WPW) syndrome     seen on 2019 [facility]    Major depressive disorder, single episode     1/15/2016    Nicotine use disorder 07/20/2018    Palpitations      Pneumonia     2003,1 day hospitalization    Tetrahydrocannabinol (THC) use disorder, mild, abuse 2018    Tic disorder     2012,both motor and verbal     Past Surgical History:   Procedure Laterality Date    DILATION AND CURETTAGE SUCTION  2022    IAB    DILATION AND CURETTAGE SUCTION N/A 2022    Procedure: DILATION AND CURETTAGE, UTERUS, USING SUCTION;  Surgeon: Jen Cisneros MD;  [facility]    ESOPHAGOSCOPY, GASTROSCOPY, DUODENOSCOPY (EGD), COMBINED N/A 2018    Procedure: COMBINED ESOPHAGOSCOPY, GASTROSCOPY, DUODENOSCOPY (EGD), BIOPSY SINGLE OR MULTIPLE;  Gastroscopy;  Surgeon: Sreekanth Shaw MD;  Location: [facility] OR    HERNIA REPAIR       05,,HERNIA REPAIR,Repair of an epigastric and umbilical     Social History     Tobacco Use    Smoking status: Former     Current packs/day: 0.00     Average packs/day: 0.5 packs/day for 5.0 years (2.5 ttl pk-yrs)     Types: Cigarettes     Start date: 2017     Quit date: 2022     Years since quittin.6     Passive exposure: Never    Smokeless tobacco: Never   Substance Use Topics    Alcohol use: Yes     Comment: occasional     Current Outpatient Medications   Medication Sig Dispense Refill    ALPRAZolam (XANAX) 0.5 MG tablet Take 1-2 tablets 30-60 minutes before MRI 2 tablet 0    busPIRone (BUSPAR) 10 MG tablet Take 10 mg by mouth 3 times daily      Cholecalciferol (VITAMIN D3) 50 MCG (2000 UT) TABS Take 3 capsules by mouth every morning Take with food       clindamycin-benzoyl peroxide (BENZACLIN) 1-5 % external gel APPLY TOPICALLY TO FACE 1 TIME IN THE MORNING AFTER WASHING. FOLLOW WITH A MOISTURIZER AS NEEDED      spironolactone (ALDACTONE) 100 MG tablet Take 1 tablet by mouth daily at 2 pm      tretinoin (RETIN-A) 0.1 % external cream       busPIRone (BUSPAR) 5 MG tablet 10 mg (Patient not taking: Reported on 2024)      dibucaine 1 % OINT rectal ointment Place rectally 3 times daily as needed for hemorrhoids or pain  "(Patient not taking: Reported on 8/12/2024) 60 g 11    gabapentin (NEURONTIN) 100 MG capsule  (Patient not taking: Reported on 8/12/2024)      magnesium 250 MG tablet Take 1 tablet by mouth daily (Patient not taking: Reported on 8/12/2024)      nitroGLYcerin (RECTIV) 0.4 % OINT rectal ointment Place 1 inch (1.5 mg) rectally every 12 hours (Patient not taking: Reported on 8/12/2024) 30 g 0    OLANZapine (ZYPREXA) 5 MG tablet Take 5 mg by mouth at bedtime  TAKE 1-2 TABLETS BY MOUTH EVERY NIGHT AT BEDTIME AS NEEDED FOR AGITATION/ANXIETY/SHELLI/PSYCHOTIC SYMPTOMS (Patient not taking: Reported on 3/15/2024)       No Known Allergies  Past medical history, past surgical history, current medications and allergies reviewed and accurate to the best of my knowledge.      ROS:  Refer to HPI    BP 95/60 (BP Location: Left arm, Patient Position: Sitting, Cuff Size: Adult Regular)   Pulse 55   Temp 98.2  F (36.8  C) (Temporal)   Resp 16   Ht 1.753 m (5' 9\")   Wt 82.2 kg (181 lb 3.2 oz)   LMP  (LMP Unknown)   SpO2 97%   BMI 26.76 kg/m      EXAM:  General Appearance: Well appearing 19 year old female, appropriate appearance for age. No acute distress   Respiratory: normal chest wall and respirations.  Normal effort. No increased work of breathing.  No cough appreciated.  Cardiac: RRR   Musculoskeletal:  Equal movement of bilateral upper extremities.  Equal movement of bilateral lower extremities.  Normal gait.    GC: Patient declined vaginal exam and she is currently menstruating  Dermatological: no rashes noted of exposed skin  Neuro: Alert and oriented to person, place, and time.    Psychological: normal affect, alert, oriented, and pleasant.     Labs:  Results for orders placed or performed in visit on 08/12/24   Pregnancy, Urine (HCG)     Status: Normal   Result Value Ref Range    hCG Urine Qualitative Negative Negative   Chlamydia trachomatis/Neisseria gonorrhoeae by PCR     Status: Normal    Specimen: Urine, Voided "   Result Value Ref Range    Chlamydia Trachomatis Negative Negative    Neisseria gonorrhoeae Negative Negative    Narrative    Assay performed using [place] real-time, reverse-transcriptase PCR.   Multiplex Vaginal Panel by PCR     Status: Abnormal    Specimen: Vagina; Swab   Result Value Ref Range    Bacterial Vaginosis Organism DNA Negative Negative    Candida Group DNA Detected (A) Not Detected    Candida glabrata / Lianna krusei DNA Not Detected Not Detected    Trichomonas vaginalis DNA Not Detected Not Detected    Narrative    The Xpert  Xpress MVP test, performed on the [employer], is an automated, qualitative in vitro diagnostic test for the detection of DNA targets from anaerobic bacteria associated with bacterial vaginosis, Candida species associated with vulvovaginal candidiasis, and Trichomonas vaginalis. The assay uses clinician-collected and self-collected vaginal swabs from patients who are symptomatic for vaginitis/ vaginosis. The Xpert  Xpress MVP test utilizes real-time polymerase chain reaction (PCR) for the amplification of specific DNA targets and utilizes fluorogenic target-specific hybridization probes to detect and differentiate DNA. It is intended to aid in the diagnosis of vaginal infections in women with a clinical presentation consistent with bacterial vaginosis, vulvovaginal candidiasis, or trichomoniasis.   The assay targets three anaerobic microorgansims that are associated with bacterial vaginosis (BV). Other organisms that are not detected by the Xpert  Xpress MVP test have also been reported to be associated with BV. The BV organism and Candida species targets of the Xpert  Xpress MVP test can be commensal in women; positive results must be considered in conjunction with other clinical and patient information to determine the disease status.

## 2024-09-05 NOTE — LETTER
December 21, 2018      Katrina Hillman  [address]  [place] [facility] [id number]        Dear Katrina,     This is to remind you that you are due for your one month follow up office visit with LOUIS SINCLAIR MD.  Your last visit was on 10/26/2018.     Additional refills of your medication require you to complete this visit.    Please call [phone] to schedule your appointment.    Thank you for choosing [facility] and [facility] for your health care needs.    Sincerely,      Refill RN  [facility]                       none

## 2024-09-21 ENCOUNTER — HEALTH MAINTENANCE LETTER (OUTPATIENT)
Age: 19
End: 2024-09-21

## 2024-10-22 NOTE — PROGRESS NOTES
Assessment & Plan     1. Multiple joint pain (Primary)  Chronic, persistent with progression over the last year with weight lifting.  Vitals and exam stable.  Lab work to rule out potential underlying autoimmune cause as below.  Will notify with results.  - TSH; Future  - T4, Free; Future  - T3, Free; Future  - Anti Nuclear Fern IgG by IFA with Reflex; Future  - Rheumatoid factor; Future  - Cyclic Citrullinated Peptide Antibody IgG; Future  - CRP inflammation; Future  - Sedimentation Rate (ESR); Future  - TSH  - T4, Free  - T3, Free  - Anti Nuclear Fern IgG by IFA with Reflex  - Rheumatoid factor  - Cyclic Citrullinated Peptide Antibody IgG  - CRP inflammation  - Sedimentation Rate (ESR)    2. Nonintractable headache, unspecified chronicity pattern, unspecified headache type  Chronic random sudden/sharp head pains that resolved after few seconds for the last year with progression in the last 3 months.  Reviewed brain imaging from May showing stable small pituitary lesion, otherwise unremarkable.  Neurology visit from 12/23 did not feel additional evaluation was indicated for pituitary growth.  Will complete labs as below for additional evaluation.  Consider possible neurologic follow-up if labs unremarkable.  - CBC and Differential; Future  - Comprehensive Metabolic Panel; Future  - Hemoglobin A1c; Future  - Magnesium; Future  - TSH; Future  - T4, Free; Future  - T3, Free; Future  - CBC and Differential  - Comprehensive Metabolic Panel  - Hemoglobin A1c  - Magnesium  - TSH  - T4, Free  - T3, Free    3. Fatigue, unspecified type  Chronic, persistent.  Differential includes related to significant depression/mental health, anemia, vitamin, iron, electrolyte malady, thyroid disorder, renal dysfunction, infection, underlying neurologic cause, underlying autoimmune cause, etc.  Vitals and exam stable.  Labs pending as below.  - CBC and Differential; Future  - Comprehensive Metabolic Panel; Future  - Hemoglobin A1c;  Future  - Magnesium; Future  - TSH; Future  - T4, Free; Future  - T3, Free; Future  - Anti Nuclear Fern IgG by IFA with Reflex; Future  - Rheumatoid factor; Future  - Cyclic Citrullinated Peptide Antibody IgG; Future  - CRP inflammation; Future  - Sedimentation Rate (ESR); Future  - Ferritin; Future  - Vitamin B12; Future  - Vitamin D Total; Future  - CBC and Differential  - Comprehensive Metabolic Panel  - Hemoglobin A1c  - Magnesium  - TSH  - T4, Free  - T3    4. Abnormal bowel movement  5. Abdominal pain, generalized  Couple months of generalized abdominal pains with alternating looser stools and harder stools/constipation.  Met with nutritionist this morning who recommended low FODMAPs diet.  Also encouraged patient to work towards identifying possible underlying food triggers.  Labs pending as below for additional evaluation.  - CBC and Differential; Future  - Comprehensive Metabolic Panel; Future  - Hemoglobin A1c; Future  - Magnesium; Future  - TSH; Future  - T4, Free; Future  - T3, Free; Future  - CBC and Differential  - Comprehensive Metabolic Panel  - Hemoglobin A1c  - Magnesium  - TSH  - T4, Free  - T3, Free      No follow-ups on file.    Bryant Herndon is a 19 year old, presenting for the following health issues:  Fatigue and Pain (Fatigue, pain and cracking in joints, gastric problems, head pain)    History of Present Illness       Reason for visit:  Mutritional guidance  Symptom onset:  More than a month  Symptoms include:  Loose stools, stomach pain  Symptom intensity:  Moderate  Symptom progression:  Staying the same  Had these symptoms before:  Yes  Has tried/received treatment for these symptoms:  Yes  Previous treatment was successful:  No  What makes it worse:  No  What makes it better:  No She is missing 2 dose(s) of medications per week.  She is not taking prescribed medications regularly due to remembering to take.     Here for evaluation of constellation of symptoms.  Patient reports she  has been dealing with multiple joint pains and cracking in bilateral hips, bilateral knees as well as other smaller joints for quite some time.  Has become more noticeable over the last year given she has been weightlifting.  No known injuries to these joints.  No overlying erythema, warmth, swelling.  Has also been experiencing increasing head pains for approximately the last year but is worsening over the last 3 months.  Describes sudden sharp pains that change locations.  Symptoms only last for a few seconds and resolve completely.  Has had brain imaging within the last year with follow-up imaging in May that did show small stable pituitary growth, otherwise unremarkable.  Neurology felt this pituitary growth did not require further evaluation when patient was last seen in 12/2023.  No associated significant lightheadedness, syncope, visual changes.  Also reports she has felt quite fatigued.  She is noticing a decreased appetite as well as generalized stomach pains for last couple months.  Has been noticing alternating looser stools with harder stools every few days.  Stool changes have not present for approximately 3 months.  Met with nutritionist this morning to help determine potential dietary triggers.  They will be working towards a low FODMAPs diet for management.    PAST MEDICAL HISTORY:   Past Medical History:   Diagnosis Date    Abdominal pain     04/24/08,x6 months thought to be secondary to GERD (upper GI with small bowel followthrough scheduled)    Anxiety     Carotid bruit     Constipation     No Comments Provided    Dyspnea     Gastroesophageal reflux disease with esophagitis     High risk heterosexual behavior 04/14/2017    G1 2/22    History of Pamella-Parkinson-White (WPW) syndrome     seen on 2019 [facility]    Major depressive disorder, single episode     1/15/2016    Nicotine use disorder 07/20/2018    Palpitations     Pneumonia     2003,1 day hospitalization    Tetrahydrocannabinol (THC) use  disorder, mild, abuse 2018    Tic disorder     2012,both motor and verbal       PAST SURGICAL HISTORY:   Past Surgical History:   Procedure Laterality Date    DILATION AND CURETTAGE SUCTION  2022    IAB    DILATION AND CURETTAGE SUCTION N/A 2022    Procedure: DILATION AND CURETTAGE, UTERUS, USING SUCTION;  Surgeon: Jen Cisneros MD;  Location: [facility]    ESOPHAGOSCOPY, GASTROSCOPY, DUODENOSCOPY (EGD), COMBINED N/A 2018    Procedure: COMBINED ESOPHAGOSCOPY, GASTROSCOPY, DUODENOSCOPY (EGD), BIOPSY SINGLE OR MULTIPLE;  Gastroscopy;  Surgeon: Sreekanth Shaw MD;  Location: [facility] OR    HERNIA REPAIR       05,,HERNIA REPAIR,Repair of an epigastric and umbilical       FAMILY HISTORY:   Family History   Problem Relation Age of Onset    Cancer Mother         Cancer,Hodgkin's lymphoma as teenager.    Other - See Comments Father         GI Disease,Ulcer, hernia    Bleeding Disorder Sister         Bloody noses    Heart Defect Maternal Grandfather     Other - See Comments Paternal Uncle         tics in childhood    Anesthesia Reaction No family hx of        SOCIAL HISTORY:   Social History     Tobacco Use    Smoking status: Former     Current packs/day: 0.00     Average packs/day: 0.5 packs/day for 5.0 years (2.5 ttl pk-yrs)     Types: Cigarettes     Start date: 2017     Quit date: 2022     Years since quittin.8     Passive exposure: Never    Smokeless tobacco: Never   Substance Use Topics    Alcohol use: Yes     Comment: occasional      No Known Allergies  Current Outpatient Medications   Medication Sig Dispense Refill    ALPRAZolam (XANAX) 0.5 MG tablet Take 1-2 tablets 30-60 minutes before MRI 2 tablet 0    Cholecalciferol (VITAMIN D3) 50 MCG ( UT) TABS Take 3 capsules by mouth every morning Take with food       clindamycin-benzoyl peroxide (BENZACLIN) 1-5 % external gel APPLY TOPICALLY TO FACE 1 TIME IN THE MORNING AFTER WASHING. FOLLOW WITH A MOISTURIZER AS NEEDED       "spironolactone (ALDACTONE) 100 MG tablet Take 1 tablet by mouth daily at 2 pm      tretinoin (RETIN-A) 0.1 % external cream       busPIRone (BUSPAR) 10 MG tablet Take 10 mg by mouth 3 times daily      busPIRone (BUSPAR) 5 MG tablet 10 mg (Patient not taking: Reported on 5/21/2024)      dibucaine 1 % OINT rectal ointment Place rectally 3 times daily as needed for hemorrhoids or pain (Patient not taking: Reported on 8/12/2024) 60 g 11    gabapentin (NEURONTIN) 100 MG capsule  (Patient not taking: Reported on 10/24/2024)      magnesium 250 MG tablet Take 1 tablet by mouth daily (Patient not taking: Reported on 10/24/2024)      nitroGLYcerin (RECTIV) 0.4 % OINT rectal ointment Place 1 inch (1.5 mg) rectally every 12 hours (Patient not taking: Reported on 8/12/2024) 30 g 0    OLANZapine (ZYPREXA) 5 MG tablet Take 5 mg by mouth at bedtime  TAKE 1-2 TABLETS BY MOUTH EVERY NIGHT AT BEDTIME AS NEEDED FOR AGITATION/ANXIETY/SHELLI/PSYCHOTIC SYMPTOMS (Patient not taking: Reported on 3/15/2024)       No current facility-administered medications for this visit.           Objective    /58   Pulse 68   Temp 98.4  F (36.9  C) (Tympanic)   Resp 18   Ht 1.753 m (5' 9\")   Wt 81.6 kg (180 lb)   LMP 10/07/2024 (Exact Date)   SpO2 98%   BMI 26.58 kg/m    Body mass index is 26.58 kg/m .  Physical Exam   General: Pleasant, in no apparent distress.  Eyes: Sclera are white and conjunctiva are clear bilaterally. Lacrimal apparatus free of erythema, edema, and discharge bilaterally.  Ears: External ears without erythema or edema.   Nose: External nose is symmetrical and free of lesions and deformities.    Skin: No jaundice, pallor, rashes, or lesions.  Psych: Appropriate mood and affect.      Signed Electronically by: Joelle Faith PA-C    "

## 2024-10-24 ENCOUNTER — OFFICE VISIT (OUTPATIENT)
Dept: FAMILY MEDICINE | Facility: OTHER | Age: 19
End: 2024-10-24
Attending: PHYSICIAN ASSISTANT
Payer: COMMERCIAL

## 2024-10-24 ENCOUNTER — OFFICE VISIT (OUTPATIENT)
Dept: FAMILY MEDICINE | Facility: OTHER | Age: 19
End: 2024-10-24
Payer: COMMERCIAL

## 2024-10-24 VITALS
TEMPERATURE: 98.4 F | OXYGEN SATURATION: 98 % | RESPIRATION RATE: 18 BRPM | HEIGHT: 69 IN | HEART RATE: 68 BPM | BODY MASS INDEX: 26.66 KG/M2 | WEIGHT: 180 LBS | DIASTOLIC BLOOD PRESSURE: 58 MMHG | SYSTOLIC BLOOD PRESSURE: 104 MMHG

## 2024-10-24 DIAGNOSIS — R10.84 ABDOMINAL PAIN, GENERALIZED: ICD-10-CM

## 2024-10-24 DIAGNOSIS — R51.9 NONINTRACTABLE HEADACHE, UNSPECIFIED CHRONICITY PATTERN, UNSPECIFIED HEADACHE TYPE: ICD-10-CM

## 2024-10-24 DIAGNOSIS — M25.50 MULTIPLE JOINT PAIN: Primary | ICD-10-CM

## 2024-10-24 DIAGNOSIS — R19.8 ABNORMAL BOWEL MOVEMENT: ICD-10-CM

## 2024-10-24 DIAGNOSIS — R53.83 FATIGUE, UNSPECIFIED TYPE: ICD-10-CM

## 2024-10-24 LAB
ALBUMIN SERPL BCG-MCNC: 4.7 G/DL (ref 3.5–5.2)
ALP SERPL-CCNC: 54 U/L (ref 40–150)
ALT SERPL W P-5'-P-CCNC: 21 U/L (ref 0–50)
ANION GAP SERPL CALCULATED.3IONS-SCNC: 7 MMOL/L (ref 7–15)
AST SERPL W P-5'-P-CCNC: 28 U/L (ref 0–45)
BASOPHILS # BLD AUTO: 0 10E3/UL (ref 0–0.2)
BASOPHILS NFR BLD AUTO: 0 %
BILIRUB SERPL-MCNC: 0.4 MG/DL
BUN SERPL-MCNC: 18.7 MG/DL (ref 6–20)
CALCIUM SERPL-MCNC: 9.5 MG/DL (ref 8.8–10.4)
CHLORIDE SERPL-SCNC: 103 MMOL/L (ref 98–107)
CREAT SERPL-MCNC: 0.82 MG/DL (ref 0.51–0.95)
CRP SERPL-MCNC: <3 MG/L
EGFRCR SERPLBLD CKD-EPI 2021: >90 ML/MIN/1.73M2
EOSINOPHIL # BLD AUTO: 0.1 10E3/UL (ref 0–0.7)
EOSINOPHIL NFR BLD AUTO: 1 %
ERYTHROCYTE [DISTWIDTH] IN BLOOD BY AUTOMATED COUNT: 12.9 % (ref 10–15)
ERYTHROCYTE [SEDIMENTATION RATE] IN BLOOD BY WESTERGREN METHOD: 1 MM/HR (ref 0–20)
EST. AVERAGE GLUCOSE BLD GHB EST-MCNC: 97 MG/DL
FERRITIN SERPL-MCNC: 63 NG/ML (ref 6–175)
GLUCOSE SERPL-MCNC: 87 MG/DL (ref 70–99)
HBA1C MFR BLD: 5 %
HCO3 SERPL-SCNC: 28 MMOL/L (ref 22–29)
HCT VFR BLD AUTO: 41.9 % (ref 35–47)
HGB BLD-MCNC: 13.9 G/DL (ref 11.7–15.7)
IMM GRANULOCYTES # BLD: 0 10E3/UL
IMM GRANULOCYTES NFR BLD: 0 %
LYMPHOCYTES # BLD AUTO: 2.6 10E3/UL (ref 0.8–5.3)
LYMPHOCYTES NFR BLD AUTO: 25 %
MAGNESIUM SERPL-MCNC: 2.2 MG/DL (ref 1.7–2.3)
MCH RBC QN AUTO: 30.2 PG (ref 26.5–33)
MCHC RBC AUTO-ENTMCNC: 33.2 G/DL (ref 31.5–36.5)
MCV RBC AUTO: 91 FL (ref 78–100)
MONOCYTES # BLD AUTO: 0.5 10E3/UL (ref 0–1.3)
MONOCYTES NFR BLD AUTO: 5 %
NEUTROPHILS # BLD AUTO: 7.2 10E3/UL (ref 1.6–8.3)
NEUTROPHILS NFR BLD AUTO: 69 %
NRBC # BLD AUTO: 0 10E3/UL
NRBC BLD AUTO-RTO: 0 /100
PLATELET # BLD AUTO: 215 10E3/UL (ref 150–450)
POTASSIUM SERPL-SCNC: 4.2 MMOL/L (ref 3.4–5.3)
PROT SERPL-MCNC: 7.4 G/DL (ref 6.4–8.3)
RBC # BLD AUTO: 4.61 10E6/UL (ref 3.8–5.2)
RHEUMATOID FACT SERPL-ACNC: <10 IU/ML
SODIUM SERPL-SCNC: 138 MMOL/L (ref 135–145)
T3FREE SERPL-MCNC: 2.9 PG/ML (ref 2–4.4)
T4 FREE SERPL-MCNC: 1.21 NG/DL (ref 0.9–1.7)
TSH SERPL DL<=0.005 MIU/L-ACNC: 1.65 UIU/ML (ref 0.3–4.2)
VIT B12 SERPL-MCNC: 764 PG/ML (ref 232–1245)
VIT D+METAB SERPL-MCNC: 93 NG/ML (ref 20–50)
WBC # BLD AUTO: 10.4 10E3/UL (ref 4–11)

## 2024-10-24 PROCEDURE — 84439 ASSAY OF FREE THYROXINE: CPT | Mod: ZL | Performed by: PHYSICIAN ASSISTANT

## 2024-10-24 PROCEDURE — 83036 HEMOGLOBIN GLYCOSYLATED A1C: CPT | Mod: ZL | Performed by: PHYSICIAN ASSISTANT

## 2024-10-24 PROCEDURE — 99214 OFFICE O/P EST MOD 30 MIN: CPT | Performed by: PHYSICIAN ASSISTANT

## 2024-10-24 PROCEDURE — 86200 CCP ANTIBODY: CPT | Mod: ZL | Performed by: PHYSICIAN ASSISTANT

## 2024-10-24 PROCEDURE — 97802 MEDICAL NUTRITION INDIV IN: CPT | Performed by: DIETITIAN, REGISTERED

## 2024-10-24 PROCEDURE — 36415 COLL VENOUS BLD VENIPUNCTURE: CPT | Mod: ZL | Performed by: PHYSICIAN ASSISTANT

## 2024-10-24 PROCEDURE — 85025 COMPLETE CBC W/AUTO DIFF WBC: CPT | Mod: ZL | Performed by: PHYSICIAN ASSISTANT

## 2024-10-24 PROCEDURE — 82728 ASSAY OF FERRITIN: CPT | Mod: ZL | Performed by: PHYSICIAN ASSISTANT

## 2024-10-24 PROCEDURE — 85652 RBC SED RATE AUTOMATED: CPT | Mod: ZL | Performed by: PHYSICIAN ASSISTANT

## 2024-10-24 PROCEDURE — 82306 VITAMIN D 25 HYDROXY: CPT | Mod: ZL | Performed by: PHYSICIAN ASSISTANT

## 2024-10-24 PROCEDURE — G2211 COMPLEX E/M VISIT ADD ON: HCPCS | Performed by: PHYSICIAN ASSISTANT

## 2024-10-24 PROCEDURE — 83735 ASSAY OF MAGNESIUM: CPT | Mod: ZL | Performed by: PHYSICIAN ASSISTANT

## 2024-10-24 PROCEDURE — 82607 VITAMIN B-12: CPT | Mod: ZL | Performed by: PHYSICIAN ASSISTANT

## 2024-10-24 PROCEDURE — 86431 RHEUMATOID FACTOR QUANT: CPT | Mod: ZL | Performed by: PHYSICIAN ASSISTANT

## 2024-10-24 PROCEDURE — 86140 C-REACTIVE PROTEIN: CPT | Mod: ZL | Performed by: PHYSICIAN ASSISTANT

## 2024-10-24 PROCEDURE — 80053 COMPREHEN METABOLIC PANEL: CPT | Mod: ZL | Performed by: PHYSICIAN ASSISTANT

## 2024-10-24 PROCEDURE — 84443 ASSAY THYROID STIM HORMONE: CPT | Mod: ZL | Performed by: PHYSICIAN ASSISTANT

## 2024-10-24 PROCEDURE — 84481 FREE ASSAY (FT-3): CPT | Mod: ZL | Performed by: PHYSICIAN ASSISTANT

## 2024-10-24 PROCEDURE — 86038 ANTINUCLEAR ANTIBODIES: CPT | Mod: ZL | Performed by: PHYSICIAN ASSISTANT

## 2024-10-24 ASSESSMENT — ANXIETY QUESTIONNAIRES
7. FEELING AFRAID AS IF SOMETHING AWFUL MIGHT HAPPEN: NEARLY EVERY DAY
4. TROUBLE RELAXING: NEARLY EVERY DAY
3. WORRYING TOO MUCH ABOUT DIFFERENT THINGS: NEARLY EVERY DAY
GAD7 TOTAL SCORE: 21
6. BECOMING EASILY ANNOYED OR IRRITABLE: NEARLY EVERY DAY
7. FEELING AFRAID AS IF SOMETHING AWFUL MIGHT HAPPEN: NEARLY EVERY DAY
5. BEING SO RESTLESS THAT IT IS HARD TO SIT STILL: NEARLY EVERY DAY
1. FEELING NERVOUS, ANXIOUS, OR ON EDGE: NEARLY EVERY DAY
GAD7 TOTAL SCORE: 21
8. IF YOU CHECKED OFF ANY PROBLEMS, HOW DIFFICULT HAVE THESE MADE IT FOR YOU TO DO YOUR WORK, TAKE CARE OF THINGS AT HOME, OR GET ALONG WITH OTHER PEOPLE?: EXTREMELY DIFFICULT
IF YOU CHECKED OFF ANY PROBLEMS ON THIS QUESTIONNAIRE, HOW DIFFICULT HAVE THESE PROBLEMS MADE IT FOR YOU TO DO YOUR WORK, TAKE CARE OF THINGS AT HOME, OR GET ALONG WITH OTHER PEOPLE: EXTREMELY DIFFICULT
GAD7 TOTAL SCORE: 21
2. NOT BEING ABLE TO STOP OR CONTROL WORRYING: NEARLY EVERY DAY

## 2024-10-24 ASSESSMENT — PATIENT HEALTH QUESTIONNAIRE - PHQ9
10. IF YOU CHECKED OFF ANY PROBLEMS, HOW DIFFICULT HAVE THESE PROBLEMS MADE IT FOR YOU TO DO YOUR WORK, TAKE CARE OF THINGS AT HOME, OR GET ALONG WITH OTHER PEOPLE: EXTREMELY DIFFICULT
SUM OF ALL RESPONSES TO PHQ QUESTIONS 1-9: 23
SUM OF ALL RESPONSES TO PHQ QUESTIONS 1-9: 23

## 2024-10-24 ASSESSMENT — PAIN SCALES - GENERAL: PAINLEVEL_OUTOF10: NO PAIN (0)

## 2024-10-24 NOTE — NURSING NOTE
"Chief Complaint   Patient presents with    Fatigue    Pain     Fatigue, pain and cracking in joints, gastric problems       Initial /58   Pulse 68   Temp 98.4  F (36.9  C) (Tympanic)   Resp 18   Ht 1.753 m (5' 9\")   Wt 81.6 kg (180 lb)   LMP 10/07/2024 (Exact Date)   SpO2 98%   BMI 26.58 kg/m   Estimated body mass index is 26.58 kg/m  as calculated from the following:    Height as of this encounter: 1.753 m (5' 9\").    Weight as of this encounter: 81.6 kg (180 lb).  Medication Review: complete    The next two questions are to help us understand your food security.  If you are feeling you need any assistance in this area, we have resources available to support you today.          1/17/2024   SDOH- Food Insecurity   Within the past 12 months, did you worry that your food would run out before you got money to buy more? Y    Within the past 12 months, did the food you bought just not last and you didn t have money to get more? Y        Patient-reported         Health Care Directive:  Patient does not have a Health Care Directive: Discussed advance care planning with patient; however, patient declined at this time.    Anabel Alonso LPN      "

## 2024-10-24 NOTE — PROGRESS NOTES
Katrina is here today for MNT related to fatigue, abnormal bowel movements and stomach upset.     PCP: No Ref-Primary, Physician    Katrina has been trying to eat healthier for about the past year and has been finding her stomach to be more upset than it was when she was unconcerned about what she was eating. She has visited with a dietitian in the past and follows a fairly decent diet: eggs, west, avocado, and toast at breakfast; fruit, vegetable, protein at lunch and supper. She snacks on higher protein foods like tuna. She works out and has been trying to build muscle. She drinks water, no sweetened beverages.     Dx: No diagnosis found. Fatigue, possible IBS like symptoms with bouts of diarrhea and constipation.     Diet: low FODMAP diet for finding trigger foods.     Education today: discussed the elimination low FODMAP diet. Encouraged her to find foods on the allowed list and then come up with a 2 week menu, eliminating the FODMAP foods. Discussed adding favorite or previously tolerated foods back first for more options at meals.     Materials provided: low FODMAP MNT, food charts and tips.     Goals/plan: Natalia will come up with a 2 week menu avoiding the high FODMAP foods. She will reintroduce them and keep records to find her trigger foods.     Katrina stated understanding and had no questions at this time.     Follow up: potentially in 1 month     Time spent: 23 min     Mary Arechiga RD on 10/24/2024 at 9:15 AM    Answers submitted by the patient for this visit:  General Questionnaire (Submitted on 10/24/2024)  Chief Complaint: Chronic problems general questions HPI Form  How many days per week do you miss taking your medication?: 2  What makes it hard for you to take your medication every day?: remembering to take  General Concern (Submitted on 10/24/2024)  Chief Complaint: Chronic problems general questions HPI Form  What is the reason for your visit today?: mutritional guidance  When did your symptoms  begin?: More than a month  What are your symptoms?: loose stools, stomach pain  How would you describe these symptoms?: Moderate  Are your symptoms:: Staying the same  Have you had these symptoms before?: Yes  Have you tried or received treatment for these symptoms before?: Yes  Did that treatment work? : No  Is there anything that makes you feel worse?: no  Is there anything that makes you feel better?: no  Questionnaire about: Chronic problems general questions HPI Form (Submitted on 10/24/2024)  Chief Complaint: Chronic problems general questions HPI Form

## 2024-10-25 LAB
ANA SER QL IF: NEGATIVE
CCP AB SER IA-ACNC: 1 U/ML

## 2025-05-17 ENCOUNTER — MYC MEDICAL ADVICE (OUTPATIENT)
Dept: FAMILY MEDICINE | Facility: OTHER | Age: 20
End: 2025-05-17
Payer: COMMERCIAL

## 2025-05-17 DIAGNOSIS — L70.0 ACNE VULGARIS: Primary | ICD-10-CM

## 2025-05-19 RX ORDER — MINOCYCLINE HYDROCHLORIDE 100 MG/1
100 TABLET ORAL 2 TIMES DAILY
Qty: 180 TABLET | Refills: 3 | Status: SHIPPED | OUTPATIENT
Start: 2025-05-19

## 2025-05-19 NOTE — TELEPHONE ENCOUNTER
Pt was ordered Minocycline 100 mg BID by dermatology. Insurance wont cover due to dermatologist not taking state insurance. Wondering if you would be willing to send the prescription in for her?     Jcarlos'd up order if willing.     Routing to provider to review and respond.  Diallo Rivas RN on 5/19/2025 at 10:40 AM

## (undated) DEVICE — ENDO KIT COMPLIANCE DYKENDOCMPLY

## (undated) DEVICE — LINEN GOWN X4 5410

## (undated) DEVICE — SOL WATER 1500ML

## (undated) DEVICE — ASPIRATOR DBL VALVE IPASS MVA PLUS DVS-SU DVS-S10

## (undated) DEVICE — SOL WATER IRRIG 1000ML BOTTLE 2F7114

## (undated) DEVICE — TUBING SUCTION VACUUM COLLECTION 6FT 610

## (undated) DEVICE — Device

## (undated) DEVICE — GLOVE PROTEXIS BLUE W/NEU-THERA 7.0  2D73EB70

## (undated) DEVICE — GLOVE PROTEXIS W/NEU-THERA 6.5  2D73TE65

## (undated) DEVICE — PREP SCRUB SOL EXIDINE 4% CHG 4OZ 29002-404

## (undated) DEVICE — SYR 50ML LL W/O NDL 309653

## (undated) DEVICE — DECANTER TRANSFER DEVICE 2008S

## (undated) DEVICE — SOL NACL 0.9% IRRIG 1000ML BOTTLE 2F7124

## (undated) DEVICE — ENDO BITE BLOCK 60 MAXI LF 00712804

## (undated) DEVICE — ENDO FORCEP ENDOJAW BIOPSY 2.8MMX230CM FB-220U

## (undated) DEVICE — TUBING SUCTION 10'X3/16" N510

## (undated) DEVICE — SUCTION VACUUM CANISTER STANDARD W/LID&CAPS 003987-901

## (undated) DEVICE — PAD CHUX UNDERPAD 30X36" P3036C

## (undated) DEVICE — LINEN TOWEL PACK X5 5464

## (undated) DEVICE — SUCTION MANIFOLD NEPTUNE 2 SYS 4 PORT 0702-020-000

## (undated) DEVICE — SUCTION CANNULA UTERINE 09MM CVD 022109-10

## (undated) RX ORDER — KETOROLAC TROMETHAMINE 30 MG/ML
INJECTION, SOLUTION INTRAMUSCULAR; INTRAVENOUS
Status: DISPENSED
Start: 2021-05-20

## (undated) RX ORDER — MISOPROSTOL 200 UG/1
TABLET ORAL
Status: DISPENSED
Start: 2022-02-09

## (undated) RX ORDER — LIDOCAINE HYDROCHLORIDE 10 MG/ML
INJECTION, SOLUTION EPIDURAL; INFILTRATION; INTRACAUDAL; PERINEURAL
Status: DISPENSED
Start: 2022-02-09

## (undated) RX ORDER — VASOPRESSIN 20 U/ML
INJECTION PARENTERAL
Status: DISPENSED
Start: 2022-02-09

## (undated) RX ORDER — PROPOFOL 10 MG/ML
INJECTION, EMULSION INTRAVENOUS
Status: DISPENSED
Start: 2018-06-08

## (undated) RX ORDER — FENTANYL CITRATE 50 UG/ML
INJECTION, SOLUTION INTRAMUSCULAR; INTRAVENOUS
Status: DISPENSED
Start: 2022-02-09

## (undated) RX ORDER — ONDANSETRON 2 MG/ML
INJECTION INTRAMUSCULAR; INTRAVENOUS
Status: DISPENSED
Start: 2022-02-09

## (undated) RX ORDER — LIDOCAINE HYDROCHLORIDE 10 MG/ML
INJECTION, SOLUTION INFILTRATION; PERINEURAL
Status: DISPENSED
Start: 2018-03-13

## (undated) RX ORDER — LIDOCAINE HYDROCHLORIDE 20 MG/ML
INJECTION, SOLUTION EPIDURAL; INFILTRATION; INTRACAUDAL; PERINEURAL
Status: DISPENSED
Start: 2018-06-08

## (undated) RX ORDER — CITRIC ACID/SODIUM CITRATE 334-500MG
SOLUTION, ORAL ORAL
Status: DISPENSED
Start: 2022-02-09

## (undated) RX ORDER — NEOMYCIN/BACITRACIN/POLYMYXINB 3.5-400-5K
OINTMENT (GRAM) TOPICAL
Status: DISPENSED
Start: 2021-05-20